# Patient Record
Sex: MALE | Race: WHITE | HISPANIC OR LATINO | Employment: OTHER | URBAN - METROPOLITAN AREA
[De-identification: names, ages, dates, MRNs, and addresses within clinical notes are randomized per-mention and may not be internally consistent; named-entity substitution may affect disease eponyms.]

---

## 2017-12-12 ENCOUNTER — HOSPITAL ENCOUNTER (EMERGENCY)
Facility: MEDICAL CENTER | Age: 82
End: 2017-12-12
Payer: COMMERCIAL

## 2017-12-12 VITALS
HEART RATE: 79 BPM | BODY MASS INDEX: 35.04 KG/M2 | WEIGHT: 205.25 LBS | SYSTOLIC BLOOD PRESSURE: 159 MMHG | RESPIRATION RATE: 18 BRPM | DIASTOLIC BLOOD PRESSURE: 69 MMHG | HEIGHT: 64 IN | OXYGEN SATURATION: 91 % | TEMPERATURE: 98.6 F

## 2017-12-12 LAB
ALBUMIN SERPL BCP-MCNC: 3.7 G/DL (ref 3.2–4.9)
ALBUMIN/GLOB SERPL: 1.1 G/DL
ALP SERPL-CCNC: 78 U/L (ref 30–99)
ALT SERPL-CCNC: 7 U/L (ref 2–50)
ANION GAP SERPL CALC-SCNC: 4 MMOL/L (ref 0–11.9)
APTT PPP: 30.2 SEC (ref 24.7–36)
AST SERPL-CCNC: 17 U/L (ref 12–45)
BASOPHILS # BLD AUTO: 0.5 % (ref 0–1.8)
BASOPHILS # BLD: 0.04 K/UL (ref 0–0.12)
BILIRUB SERPL-MCNC: 0.6 MG/DL (ref 0.1–1.5)
BNP SERPL-MCNC: 176 PG/ML (ref 0–100)
BUN SERPL-MCNC: 17 MG/DL (ref 8–22)
CALCIUM SERPL-MCNC: 8.4 MG/DL (ref 8.5–10.5)
CHLORIDE SERPL-SCNC: 111 MMOL/L (ref 96–112)
CO2 SERPL-SCNC: 27 MMOL/L (ref 20–33)
CREAT SERPL-MCNC: 1.16 MG/DL (ref 0.5–1.4)
EKG IMPRESSION: NORMAL
EOSINOPHIL # BLD AUTO: 0.19 K/UL (ref 0–0.51)
EOSINOPHIL NFR BLD: 2.5 % (ref 0–6.9)
ERYTHROCYTE [DISTWIDTH] IN BLOOD BY AUTOMATED COUNT: 49.2 FL (ref 35.9–50)
GFR SERPL CREATININE-BSD FRML MDRD: >60 ML/MIN/1.73 M 2
GLOBULIN SER CALC-MCNC: 3.3 G/DL (ref 1.9–3.5)
GLUCOSE SERPL-MCNC: 117 MG/DL (ref 65–99)
HCT VFR BLD AUTO: 39.4 % (ref 42–52)
HGB BLD-MCNC: 12.7 G/DL (ref 14–18)
IMM GRANULOCYTES # BLD AUTO: 0.04 K/UL (ref 0–0.11)
IMM GRANULOCYTES NFR BLD AUTO: 0.5 % (ref 0–0.9)
INR PPP: 1.14 (ref 0.87–1.13)
LIPASE SERPL-CCNC: 31 U/L (ref 11–82)
LYMPHOCYTES # BLD AUTO: 1.7 K/UL (ref 1–4.8)
LYMPHOCYTES NFR BLD: 22.3 % (ref 22–41)
MCH RBC QN AUTO: 27.9 PG (ref 27–33)
MCHC RBC AUTO-ENTMCNC: 32.2 G/DL (ref 33.7–35.3)
MCV RBC AUTO: 86.4 FL (ref 81.4–97.8)
MONOCYTES # BLD AUTO: 0.72 K/UL (ref 0–0.85)
MONOCYTES NFR BLD AUTO: 9.4 % (ref 0–13.4)
NEUTROPHILS # BLD AUTO: 4.95 K/UL (ref 1.82–7.42)
NEUTROPHILS NFR BLD: 64.8 % (ref 44–72)
NRBC # BLD AUTO: 0 K/UL
NRBC BLD AUTO-RTO: 0 /100 WBC
PLATELET # BLD AUTO: 168 K/UL (ref 164–446)
PMV BLD AUTO: 9.9 FL (ref 9–12.9)
POTASSIUM SERPL-SCNC: 4.3 MMOL/L (ref 3.6–5.5)
PROT SERPL-MCNC: 7 G/DL (ref 6–8.2)
PROTHROMBIN TIME: 14.3 SEC (ref 12–14.6)
RBC # BLD AUTO: 4.56 M/UL (ref 4.7–6.1)
SODIUM SERPL-SCNC: 142 MMOL/L (ref 135–145)
TROPONIN I SERPL-MCNC: <0.01 NG/ML (ref 0–0.04)
WBC # BLD AUTO: 7.6 K/UL (ref 4.8–10.8)

## 2017-12-12 PROCEDURE — 85610 PROTHROMBIN TIME: CPT

## 2017-12-12 PROCEDURE — 83880 ASSAY OF NATRIURETIC PEPTIDE: CPT

## 2017-12-12 PROCEDURE — 85025 COMPLETE CBC W/AUTO DIFF WBC: CPT

## 2017-12-12 PROCEDURE — 85730 THROMBOPLASTIN TIME PARTIAL: CPT

## 2017-12-12 PROCEDURE — 302449 STATCHG TRIAGE ONLY (STATISTIC)

## 2017-12-12 PROCEDURE — 84484 ASSAY OF TROPONIN QUANT: CPT

## 2017-12-12 PROCEDURE — 80053 COMPREHEN METABOLIC PANEL: CPT

## 2017-12-12 PROCEDURE — 93005 ELECTROCARDIOGRAM TRACING: CPT

## 2017-12-12 PROCEDURE — 83690 ASSAY OF LIPASE: CPT

## 2017-12-13 NOTE — ED NOTES
"Pt bib family. Pt with leg swelling and seen at MD office told to come to ED for \"problems with heart\". Pt appears SOB on exertion. Pt denies. Pt with bilateral ankle swelling. ekg paged.   "

## 2018-02-14 ENCOUNTER — HOSPITAL ENCOUNTER (INPATIENT)
Facility: MEDICAL CENTER | Age: 83
LOS: 5 days | DRG: 602 | End: 2018-02-19
Attending: EMERGENCY MEDICINE | Admitting: INTERNAL MEDICINE
Payer: COMMERCIAL

## 2018-02-14 ENCOUNTER — RESOLUTE PROFESSIONAL BILLING HOSPITAL PROF FEE (OUTPATIENT)
Dept: HOSPITALIST | Facility: MEDICAL CENTER | Age: 83
End: 2018-02-14
Payer: COMMERCIAL

## 2018-02-14 ENCOUNTER — APPOINTMENT (OUTPATIENT)
Dept: RADIOLOGY | Facility: MEDICAL CENTER | Age: 83
DRG: 602 | End: 2018-02-14
Attending: EMERGENCY MEDICINE
Payer: COMMERCIAL

## 2018-02-14 ENCOUNTER — APPOINTMENT (OUTPATIENT)
Dept: RADIOLOGY | Facility: MEDICAL CENTER | Age: 83
DRG: 602 | End: 2018-02-14
Payer: COMMERCIAL

## 2018-02-14 ENCOUNTER — APPOINTMENT (OUTPATIENT)
Dept: RADIOLOGY | Facility: MEDICAL CENTER | Age: 83
DRG: 602 | End: 2018-02-14
Attending: INTERNAL MEDICINE
Payer: COMMERCIAL

## 2018-02-14 DIAGNOSIS — R60.0 BILATERAL LOWER EXTREMITY EDEMA: ICD-10-CM

## 2018-02-14 DIAGNOSIS — I50.9 CONGESTIVE HEART FAILURE, UNSPECIFIED CONGESTIVE HEART FAILURE CHRONICITY, UNSPECIFIED CONGESTIVE HEART FAILURE TYPE: ICD-10-CM

## 2018-02-14 DIAGNOSIS — I50.32 CHRONIC DIASTOLIC HEART FAILURE (HCC): ICD-10-CM

## 2018-02-14 DIAGNOSIS — R60.9 PERIPHERAL EDEMA: ICD-10-CM

## 2018-02-14 DIAGNOSIS — R09.02 HYPOXEMIA: ICD-10-CM

## 2018-02-14 DIAGNOSIS — L03.116 CELLULITIS OF LEFT LOWER EXTREMITY: ICD-10-CM

## 2018-02-14 PROBLEM — L03.90 CELLULITIS: Status: ACTIVE | Noted: 2018-02-14

## 2018-02-14 LAB
ALBUMIN SERPL BCP-MCNC: 3.8 G/DL (ref 3.2–4.9)
ALBUMIN/GLOB SERPL: 1.1 G/DL
ALP SERPL-CCNC: 91 U/L (ref 30–99)
ALT SERPL-CCNC: 5 U/L (ref 2–50)
ANION GAP SERPL CALC-SCNC: 9 MMOL/L (ref 0–11.9)
APTT PPP: 31.5 SEC (ref 24.7–36)
AST SERPL-CCNC: 17 U/L (ref 12–45)
BASOPHILS # BLD AUTO: 0.3 % (ref 0–1.8)
BASOPHILS # BLD: 0.03 K/UL (ref 0–0.12)
BILIRUB SERPL-MCNC: 1 MG/DL (ref 0.1–1.5)
BNP SERPL-MCNC: 141 PG/ML (ref 0–100)
BUN SERPL-MCNC: 19 MG/DL (ref 8–22)
CALCIUM SERPL-MCNC: 9.3 MG/DL (ref 8.5–10.5)
CHLORIDE SERPL-SCNC: 108 MMOL/L (ref 96–112)
CO2 SERPL-SCNC: 22 MMOL/L (ref 20–33)
COMMENT 1642: NORMAL
CREAT SERPL-MCNC: 1.06 MG/DL (ref 0.5–1.4)
EKG IMPRESSION: NORMAL
EOSINOPHIL # BLD AUTO: 0.19 K/UL (ref 0–0.51)
EOSINOPHIL NFR BLD: 2.1 % (ref 0–6.9)
ERYTHROCYTE [DISTWIDTH] IN BLOOD BY AUTOMATED COUNT: 51.3 FL (ref 35.9–50)
EST. AVERAGE GLUCOSE BLD GHB EST-MCNC: 120 MG/DL
GLOBULIN SER CALC-MCNC: 3.6 G/DL (ref 1.9–3.5)
GLUCOSE SERPL-MCNC: 91 MG/DL (ref 65–99)
HBA1C MFR BLD: 5.8 % (ref 0–5.6)
HCT VFR BLD AUTO: 42.8 % (ref 42–52)
HGB BLD-MCNC: 12.8 G/DL (ref 14–18)
HYPOCHROMIA BLD QL SMEAR: ABNORMAL
IMM GRANULOCYTES # BLD AUTO: 0.03 K/UL (ref 0–0.11)
IMM GRANULOCYTES NFR BLD AUTO: 0.3 % (ref 0–0.9)
INR PPP: 1.1 (ref 0.87–1.13)
LACTATE BLD-SCNC: 1.6 MMOL/L (ref 0.5–2)
LIPASE SERPL-CCNC: 35 U/L (ref 11–82)
LYMPHOCYTES # BLD AUTO: 1.49 K/UL (ref 1–4.8)
LYMPHOCYTES NFR BLD: 16.7 % (ref 22–41)
MAGNESIUM SERPL-MCNC: 2.2 MG/DL (ref 1.5–2.5)
MCH RBC QN AUTO: 25.1 PG (ref 27–33)
MCHC RBC AUTO-ENTMCNC: 29.9 G/DL (ref 33.7–35.3)
MCV RBC AUTO: 84.1 FL (ref 81.4–97.8)
MONOCYTES # BLD AUTO: 0.85 K/UL (ref 0–0.85)
MONOCYTES NFR BLD AUTO: 9.6 % (ref 0–13.4)
MORPHOLOGY BLD-IMP: NORMAL
NEUTROPHILS # BLD AUTO: 6.31 K/UL (ref 1.82–7.42)
NEUTROPHILS NFR BLD: 71 % (ref 44–72)
NRBC # BLD AUTO: 0 K/UL
NRBC BLD-RTO: 0 /100 WBC
OVALOCYTES BLD QL SMEAR: NORMAL
PLATELET # BLD AUTO: 179 K/UL (ref 164–446)
PLATELET BLD QL SMEAR: NORMAL
PMV BLD AUTO: 10.4 FL (ref 9–12.9)
POIKILOCYTOSIS BLD QL SMEAR: NORMAL
POTASSIUM SERPL-SCNC: 4.4 MMOL/L (ref 3.6–5.5)
PROCALCITONIN SERPL-MCNC: <0.05 NG/ML
PROT SERPL-MCNC: 7.4 G/DL (ref 6–8.2)
PROTHROMBIN TIME: 13.9 SEC (ref 12–14.6)
RBC # BLD AUTO: 5.09 M/UL (ref 4.7–6.1)
RBC BLD AUTO: PRESENT
SODIUM SERPL-SCNC: 139 MMOL/L (ref 135–145)
TROPONIN I SERPL-MCNC: 0.01 NG/ML (ref 0–0.04)
TSH SERPL DL<=0.005 MIU/L-ACNC: 7.04 UIU/ML (ref 0.38–5.33)
WBC # BLD AUTO: 8.9 K/UL (ref 4.8–10.8)

## 2018-02-14 PROCEDURE — A9270 NON-COVERED ITEM OR SERVICE: HCPCS | Performed by: EMERGENCY MEDICINE

## 2018-02-14 PROCEDURE — 83605 ASSAY OF LACTIC ACID: CPT

## 2018-02-14 PROCEDURE — 87040 BLOOD CULTURE FOR BACTERIA: CPT | Mod: 91

## 2018-02-14 PROCEDURE — 90662 IIV NO PRSV INCREASED AG IM: CPT | Performed by: INTERNAL MEDICINE

## 2018-02-14 PROCEDURE — 700111 HCHG RX REV CODE 636 W/ 250 OVERRIDE (IP): Performed by: INTERNAL MEDICINE

## 2018-02-14 PROCEDURE — 93005 ELECTROCARDIOGRAM TRACING: CPT

## 2018-02-14 PROCEDURE — 90471 IMMUNIZATION ADMIN: CPT

## 2018-02-14 PROCEDURE — 93970 EXTREMITY STUDY: CPT

## 2018-02-14 PROCEDURE — 83690 ASSAY OF LIPASE: CPT

## 2018-02-14 PROCEDURE — 71045 X-RAY EXAM CHEST 1 VIEW: CPT

## 2018-02-14 PROCEDURE — 36415 COLL VENOUS BLD VENIPUNCTURE: CPT

## 2018-02-14 PROCEDURE — 84145 PROCALCITONIN (PCT): CPT

## 2018-02-14 PROCEDURE — 700105 HCHG RX REV CODE 258: Performed by: INTERNAL MEDICINE

## 2018-02-14 PROCEDURE — 96365 THER/PROPH/DIAG IV INF INIT: CPT

## 2018-02-14 PROCEDURE — 83880 ASSAY OF NATRIURETIC PEPTIDE: CPT

## 2018-02-14 PROCEDURE — A9270 NON-COVERED ITEM OR SERVICE: HCPCS | Performed by: INTERNAL MEDICINE

## 2018-02-14 PROCEDURE — 700102 HCHG RX REV CODE 250 W/ 637 OVERRIDE(OP): Performed by: INTERNAL MEDICINE

## 2018-02-14 PROCEDURE — 85610 PROTHROMBIN TIME: CPT

## 2018-02-14 PROCEDURE — 770020 HCHG ROOM/CARE - TELE (206)

## 2018-02-14 PROCEDURE — 73590 X-RAY EXAM OF LOWER LEG: CPT | Mod: LT

## 2018-02-14 PROCEDURE — 84443 ASSAY THYROID STIM HORMONE: CPT

## 2018-02-14 PROCEDURE — 83735 ASSAY OF MAGNESIUM: CPT

## 2018-02-14 PROCEDURE — 700102 HCHG RX REV CODE 250 W/ 637 OVERRIDE(OP): Performed by: EMERGENCY MEDICINE

## 2018-02-14 PROCEDURE — 96375 TX/PRO/DX INJ NEW DRUG ADDON: CPT

## 2018-02-14 PROCEDURE — 93005 ELECTROCARDIOGRAM TRACING: CPT | Performed by: EMERGENCY MEDICINE

## 2018-02-14 PROCEDURE — 304561 HCHG STAT O2

## 2018-02-14 PROCEDURE — 99223 1ST HOSP IP/OBS HIGH 75: CPT | Performed by: INTERNAL MEDICINE

## 2018-02-14 PROCEDURE — 83036 HEMOGLOBIN GLYCOSYLATED A1C: CPT

## 2018-02-14 PROCEDURE — 84484 ASSAY OF TROPONIN QUANT: CPT

## 2018-02-14 PROCEDURE — 80053 COMPREHEN METABOLIC PANEL: CPT

## 2018-02-14 PROCEDURE — 700111 HCHG RX REV CODE 636 W/ 250 OVERRIDE (IP): Performed by: EMERGENCY MEDICINE

## 2018-02-14 PROCEDURE — 3E0234Z INTRODUCTION OF SERUM, TOXOID AND VACCINE INTO MUSCLE, PERCUTANEOUS APPROACH: ICD-10-PCS | Performed by: INTERNAL MEDICINE

## 2018-02-14 PROCEDURE — 99285 EMERGENCY DEPT VISIT HI MDM: CPT

## 2018-02-14 PROCEDURE — 85730 THROMBOPLASTIN TIME PARTIAL: CPT

## 2018-02-14 PROCEDURE — 85025 COMPLETE CBC W/AUTO DIFF WBC: CPT

## 2018-02-14 PROCEDURE — 90670 PCV13 VACCINE IM: CPT | Performed by: INTERNAL MEDICINE

## 2018-02-14 RX ORDER — HEPARIN SODIUM 5000 [USP'U]/ML
5000 INJECTION, SOLUTION INTRAVENOUS; SUBCUTANEOUS EVERY 8 HOURS
Status: DISCONTINUED | OUTPATIENT
Start: 2018-02-14 | End: 2018-02-19 | Stop reason: HOSPADM

## 2018-02-14 RX ORDER — LEVOTHYROXINE SODIUM 0.05 MG/1
50 TABLET ORAL DAILY
Status: DISCONTINUED | OUTPATIENT
Start: 2018-02-14 | End: 2018-02-14

## 2018-02-14 RX ORDER — BISACODYL 10 MG
10 SUPPOSITORY, RECTAL RECTAL
Status: DISCONTINUED | OUTPATIENT
Start: 2018-02-14 | End: 2018-02-19 | Stop reason: HOSPADM

## 2018-02-14 RX ORDER — ALBUTEROL SULFATE 90 UG/1
2 AEROSOL, METERED RESPIRATORY (INHALATION)
Status: DISCONTINUED | OUTPATIENT
Start: 2018-02-14 | End: 2018-02-19 | Stop reason: HOSPADM

## 2018-02-14 RX ORDER — PRAVASTATIN SODIUM 20 MG
80 TABLET ORAL DAILY
Status: DISCONTINUED | OUTPATIENT
Start: 2018-02-14 | End: 2018-02-14

## 2018-02-14 RX ORDER — METOPROLOL TARTRATE 1 MG/ML
5 INJECTION, SOLUTION INTRAVENOUS
Status: DISCONTINUED | OUTPATIENT
Start: 2018-02-14 | End: 2018-02-19 | Stop reason: HOSPADM

## 2018-02-14 RX ORDER — AMOXICILLIN 250 MG
2 CAPSULE ORAL 2 TIMES DAILY
Status: DISCONTINUED | OUTPATIENT
Start: 2018-02-14 | End: 2018-02-19 | Stop reason: HOSPADM

## 2018-02-14 RX ORDER — ALBUTEROL SULFATE 90 UG/1
2 AEROSOL, METERED RESPIRATORY (INHALATION)
Status: DISCONTINUED | OUTPATIENT
Start: 2018-02-14 | End: 2018-02-14

## 2018-02-14 RX ORDER — ACETAMINOPHEN 325 MG/1
650 TABLET ORAL EVERY 6 HOURS PRN
Status: DISCONTINUED | OUTPATIENT
Start: 2018-02-14 | End: 2018-02-19 | Stop reason: HOSPADM

## 2018-02-14 RX ORDER — LABETALOL HYDROCHLORIDE 5 MG/ML
10 INJECTION, SOLUTION INTRAVENOUS EVERY 4 HOURS PRN
Status: DISCONTINUED | OUTPATIENT
Start: 2018-02-14 | End: 2018-02-19 | Stop reason: HOSPADM

## 2018-02-14 RX ORDER — CLOPIDOGREL BISULFATE 75 MG/1
75 TABLET ORAL DAILY
Status: DISCONTINUED | OUTPATIENT
Start: 2018-02-14 | End: 2018-02-14

## 2018-02-14 RX ORDER — SULFAMETHOXAZOLE AND TRIMETHOPRIM 800; 160 MG/1; MG/1
1 TABLET ORAL ONCE
Status: COMPLETED | OUTPATIENT
Start: 2018-02-14 | End: 2018-02-14

## 2018-02-14 RX ORDER — FUROSEMIDE 10 MG/ML
40 INJECTION INTRAMUSCULAR; INTRAVENOUS ONCE
Status: COMPLETED | OUTPATIENT
Start: 2018-02-14 | End: 2018-02-14

## 2018-02-14 RX ORDER — POLYETHYLENE GLYCOL 3350 17 G/17G
1 POWDER, FOR SOLUTION ORAL
Status: DISCONTINUED | OUTPATIENT
Start: 2018-02-14 | End: 2018-02-19 | Stop reason: HOSPADM

## 2018-02-14 RX ORDER — ASPIRIN 81 MG/1
81 TABLET, CHEWABLE ORAL DAILY
Status: DISCONTINUED | OUTPATIENT
Start: 2018-02-14 | End: 2018-02-19 | Stop reason: HOSPADM

## 2018-02-14 RX ORDER — FUROSEMIDE 10 MG/ML
20 INJECTION INTRAMUSCULAR; INTRAVENOUS
Status: DISCONTINUED | OUTPATIENT
Start: 2018-02-14 | End: 2018-02-16

## 2018-02-14 RX ORDER — LOSARTAN POTASSIUM 50 MG/1
50 TABLET ORAL DAILY
Status: DISCONTINUED | OUTPATIENT
Start: 2018-02-14 | End: 2018-02-14

## 2018-02-14 RX ORDER — AMOXICILLIN AND CLAVULANATE POTASSIUM 875; 125 MG/1; MG/1
1 TABLET, FILM COATED ORAL ONCE
Status: COMPLETED | OUTPATIENT
Start: 2018-02-14 | End: 2018-02-14

## 2018-02-14 RX ORDER — SPIRONOLACTONE 25 MG/1
25 TABLET ORAL DAILY
Status: DISCONTINUED | OUTPATIENT
Start: 2018-02-14 | End: 2018-02-14

## 2018-02-14 RX ADMIN — INFLUENZA A VIRUSA/MICHIGAN/45/2015 X-275 (H1N1) ANTIGEN (FORMALDEHYDE INACTIVATED), INFLUENZA A VIRUS A/HONG KONG/4801/2014 X-263B (H3N2) ANTIGEN (FORMALDEHYDE INACTIVATED), AND INFLUENZA B VIRUS B/BRISBANE/60/2008 ANTIGEN (FORMALDEHYDE INACTIVATED) 0.5 ML: 60; 60; 60 INJECTION, SUSPENSION INTRAMUSCULAR at 21:56

## 2018-02-14 RX ADMIN — ASPIRIN 81 MG: 81 TABLET, CHEWABLE ORAL at 21:53

## 2018-02-14 RX ADMIN — FUROSEMIDE 20 MG: 10 INJECTION, SOLUTION INTRAMUSCULAR; INTRAVENOUS at 21:56

## 2018-02-14 RX ADMIN — HEPARIN SODIUM 5000 UNITS: 5000 INJECTION, SOLUTION INTRAVENOUS; SUBCUTANEOUS at 21:56

## 2018-02-14 RX ADMIN — VANCOMYCIN HYDROCHLORIDE 2200 MG: 100 INJECTION, POWDER, LYOPHILIZED, FOR SOLUTION INTRAVENOUS at 19:50

## 2018-02-14 RX ADMIN — SULFAMETHOXAZOLE AND TRIMETHOPRIM 1 TABLET: 800; 160 TABLET ORAL at 17:17

## 2018-02-14 RX ADMIN — PNEUMOCOCCAL 13-VALENT CONJUGATE VACCINE 0.5 ML: 2.2; 2.2; 2.2; 2.2; 2.2; 4.4; 2.2; 2.2; 2.2; 2.2; 2.2; 2.2; 2.2 INJECTION, SUSPENSION INTRAMUSCULAR at 21:57

## 2018-02-14 RX ADMIN — FUROSEMIDE 40 MG: 10 INJECTION, SOLUTION INTRAMUSCULAR; INTRAVENOUS at 17:17

## 2018-02-14 RX ADMIN — AMOXICILLIN AND CLAVULANATE POTASSIUM 1 TABLET: 875; 125 TABLET, FILM COATED ORAL at 17:17

## 2018-02-14 RX ADMIN — STANDARDIZED SENNA CONCENTRATE AND DOCUSATE SODIUM 2 TABLET: 8.6; 5 TABLET, FILM COATED ORAL at 21:55

## 2018-02-14 RX ADMIN — AMPICILLIN SODIUM AND SULBACTAM SODIUM 3 G: 2; 1 INJECTION, POWDER, FOR SOLUTION INTRAMUSCULAR; INTRAVENOUS at 23:37

## 2018-02-14 ASSESSMENT — COGNITIVE AND FUNCTIONAL STATUS - GENERAL
DRESSING REGULAR LOWER BODY CLOTHING: A LITTLE
MOVING FROM LYING ON BACK TO SITTING ON SIDE OF FLAT BED: A LITTLE
MOVING TO AND FROM BED TO CHAIR: A LITTLE
TOILETING: A LITTLE
DAILY ACTIVITIY SCORE: 20
SUGGESTED CMS G CODE MODIFIER MOBILITY: CK
CLIMB 3 TO 5 STEPS WITH RAILING: A LITTLE
HELP NEEDED FOR BATHING: A LITTLE
WALKING IN HOSPITAL ROOM: A LITTLE
SUGGESTED CMS G CODE MODIFIER DAILY ACTIVITY: CJ
PERSONAL GROOMING: A LITTLE
MOBILITY SCORE: 19
STANDING UP FROM CHAIR USING ARMS: A LITTLE

## 2018-02-14 ASSESSMENT — PATIENT HEALTH QUESTIONNAIRE - PHQ9
SUM OF ALL RESPONSES TO PHQ QUESTIONS 1-9: 0
SUM OF ALL RESPONSES TO PHQ9 QUESTIONS 1 AND 2: 0
2. FEELING DOWN, DEPRESSED, IRRITABLE, OR HOPELESS: NOT AT ALL
SUM OF ALL RESPONSES TO PHQ QUESTIONS 1-9: 0
2. FEELING DOWN, DEPRESSED, IRRITABLE, OR HOPELESS: NOT AT ALL
SUM OF ALL RESPONSES TO PHQ9 QUESTIONS 1 AND 2: 0
1. LITTLE INTEREST OR PLEASURE IN DOING THINGS: NOT AT ALL
1. LITTLE INTEREST OR PLEASURE IN DOING THINGS: NOT AT ALL

## 2018-02-14 ASSESSMENT — LIFESTYLE VARIABLES
EVER_SMOKED: YES
ALCOHOL_USE: NO
ALCOHOL_USE: NO

## 2018-02-14 ASSESSMENT — ENCOUNTER SYMPTOMS
WEAKNESS: 0
EYES NEGATIVE: 1
CARDIOVASCULAR NEGATIVE: 1
PSYCHIATRIC NEGATIVE: 1
WEIGHT LOSS: 0
FEVER: 0
DIAPHORESIS: 0
NEUROLOGICAL NEGATIVE: 1
CHILLS: 0
GASTROINTESTINAL NEGATIVE: 1
COUGH: 1
MUSCULOSKELETAL NEGATIVE: 1
RESPIRATORY NEGATIVE: 1

## 2018-02-14 ASSESSMENT — COPD QUESTIONNAIRES
HAVE YOU SMOKED AT LEAST 100 CIGARETTES IN YOUR ENTIRE LIFE: YES
COPD SCREENING SCORE: 5
DURING THE PAST 4 WEEKS HOW MUCH DID YOU FEEL SHORT OF BREATH: NONE/LITTLE OF THE TIME
DO YOU EVER COUGH UP ANY MUCUS OR PHLEGM?: NO/ONLY WITH OCCASIONAL COLDS OR INFECTIONS

## 2018-02-14 NOTE — ED TRIAGE NOTES
Chief Complaint   Patient presents with   • Wound Check     sent from MD   pt went to MD office for wound check he has bilateral LE swelling with open wound to L sheen/ankle wrapped he is PMD NAD pt has sob placed on O2 nc he is A&O Bulgarian speaking.

## 2018-02-15 PROBLEM — I50.33 ACUTE ON CHRONIC DIASTOLIC CONGESTIVE HEART FAILURE (HCC): Status: ACTIVE | Noted: 2018-02-15

## 2018-02-15 LAB
ALBUMIN SERPL BCP-MCNC: 3.7 G/DL (ref 3.2–4.9)
ALBUMIN/GLOB SERPL: 1.1 G/DL
ALP SERPL-CCNC: 78 U/L (ref 30–99)
ALT SERPL-CCNC: 5 U/L (ref 2–50)
ANION GAP SERPL CALC-SCNC: 11 MMOL/L (ref 0–11.9)
AST SERPL-CCNC: 15 U/L (ref 12–45)
BILIRUB SERPL-MCNC: 1.1 MG/DL (ref 0.1–1.5)
BUN SERPL-MCNC: 19 MG/DL (ref 8–22)
CALCIUM SERPL-MCNC: 8.8 MG/DL (ref 8.5–10.5)
CHLORIDE SERPL-SCNC: 105 MMOL/L (ref 96–112)
CO2 SERPL-SCNC: 23 MMOL/L (ref 20–33)
CREAT SERPL-MCNC: 1.35 MG/DL (ref 0.5–1.4)
ERYTHROCYTE [DISTWIDTH] IN BLOOD BY AUTOMATED COUNT: 49.7 FL (ref 35.9–50)
ERYTHROCYTE [SEDIMENTATION RATE] IN BLOOD BY WESTERGREN METHOD: 43 MM/HOUR (ref 0–20)
GLOBULIN SER CALC-MCNC: 3.4 G/DL (ref 1.9–3.5)
GLUCOSE SERPL-MCNC: 98 MG/DL (ref 65–99)
GRAM STN SPEC: NORMAL
HCT VFR BLD AUTO: 41.2 % (ref 42–52)
HGB BLD-MCNC: 12.6 G/DL (ref 14–18)
LV EJECT FRACT  99904: 60
LV EJECT FRACT MOD 2C 99903: 47.24
LV EJECT FRACT MOD 4C 99902: 61.96
LV EJECT FRACT MOD BP 99901: 57.08
MCH RBC QN AUTO: 25.3 PG (ref 27–33)
MCHC RBC AUTO-ENTMCNC: 30.6 G/DL (ref 33.7–35.3)
MCV RBC AUTO: 82.7 FL (ref 81.4–97.8)
PLATELET # BLD AUTO: 144 K/UL (ref 164–446)
PMV BLD AUTO: 9.8 FL (ref 9–12.9)
POTASSIUM SERPL-SCNC: 4.3 MMOL/L (ref 3.6–5.5)
PROT SERPL-MCNC: 7.1 G/DL (ref 6–8.2)
RBC # BLD AUTO: 4.98 M/UL (ref 4.7–6.1)
SIGNIFICANT IND 70042: NORMAL
SITE SITE: NORMAL
SODIUM SERPL-SCNC: 139 MMOL/L (ref 135–145)
SOURCE SOURCE: NORMAL
T4 FREE SERPL-MCNC: 0.82 NG/DL (ref 0.53–1.43)
TSH SERPL DL<=0.005 MIU/L-ACNC: 6.45 UIU/ML (ref 0.38–5.33)
WBC # BLD AUTO: 8.8 K/UL (ref 4.8–10.8)

## 2018-02-15 PROCEDURE — 84443 ASSAY THYROID STIM HORMONE: CPT

## 2018-02-15 PROCEDURE — 85027 COMPLETE CBC AUTOMATED: CPT

## 2018-02-15 PROCEDURE — 99233 SBSQ HOSP IP/OBS HIGH 50: CPT | Performed by: INTERNAL MEDICINE

## 2018-02-15 PROCEDURE — 87077 CULTURE AEROBIC IDENTIFY: CPT

## 2018-02-15 PROCEDURE — 97161 PT EVAL LOW COMPLEX 20 MIN: CPT

## 2018-02-15 PROCEDURE — 770020 HCHG ROOM/CARE - TELE (206)

## 2018-02-15 PROCEDURE — 93306 TTE W/DOPPLER COMPLETE: CPT

## 2018-02-15 PROCEDURE — 87205 SMEAR GRAM STAIN: CPT

## 2018-02-15 PROCEDURE — 36415 COLL VENOUS BLD VENIPUNCTURE: CPT

## 2018-02-15 PROCEDURE — 85652 RBC SED RATE AUTOMATED: CPT

## 2018-02-15 PROCEDURE — 700102 HCHG RX REV CODE 250 W/ 637 OVERRIDE(OP): Performed by: INTERNAL MEDICINE

## 2018-02-15 PROCEDURE — 87186 SC STD MICRODIL/AGAR DIL: CPT

## 2018-02-15 PROCEDURE — 700111 HCHG RX REV CODE 636 W/ 250 OVERRIDE (IP): Performed by: INTERNAL MEDICINE

## 2018-02-15 PROCEDURE — 700101 HCHG RX REV CODE 250: Performed by: INTERNAL MEDICINE

## 2018-02-15 PROCEDURE — A9270 NON-COVERED ITEM OR SERVICE: HCPCS | Performed by: INTERNAL MEDICINE

## 2018-02-15 PROCEDURE — 87070 CULTURE OTHR SPECIMN AEROBIC: CPT

## 2018-02-15 PROCEDURE — 84439 ASSAY OF FREE THYROXINE: CPT

## 2018-02-15 PROCEDURE — 80053 COMPREHEN METABOLIC PANEL: CPT

## 2018-02-15 PROCEDURE — 93306 TTE W/DOPPLER COMPLETE: CPT | Mod: 26 | Performed by: INTERNAL MEDICINE

## 2018-02-15 PROCEDURE — 700105 HCHG RX REV CODE 258: Performed by: INTERNAL MEDICINE

## 2018-02-15 RX ORDER — OXYCODONE HYDROCHLORIDE 10 MG/1
10 TABLET ORAL EVERY 4 HOURS PRN
Status: DISCONTINUED | OUTPATIENT
Start: 2018-02-15 | End: 2018-02-19 | Stop reason: HOSPADM

## 2018-02-15 RX ORDER — OXYCODONE HYDROCHLORIDE 5 MG/1
5 TABLET ORAL EVERY 4 HOURS PRN
Status: DISCONTINUED | OUTPATIENT
Start: 2018-02-15 | End: 2018-02-19 | Stop reason: HOSPADM

## 2018-02-15 RX ORDER — LEVOTHYROXINE SODIUM 0.05 MG/1
50 TABLET ORAL
Status: DISCONTINUED | OUTPATIENT
Start: 2018-02-15 | End: 2018-02-19 | Stop reason: HOSPADM

## 2018-02-15 RX ORDER — OXYCODONE HYDROCHLORIDE 5 MG/1
5-10 TABLET ORAL EVERY 4 HOURS PRN
Status: DISCONTINUED | OUTPATIENT
Start: 2018-02-15 | End: 2018-02-15

## 2018-02-15 RX ORDER — CARVEDILOL 3.12 MG/1
3.12 TABLET ORAL 2 TIMES DAILY WITH MEALS
Status: DISCONTINUED | OUTPATIENT
Start: 2018-02-15 | End: 2018-02-19 | Stop reason: HOSPADM

## 2018-02-15 RX ORDER — ATORVASTATIN CALCIUM 40 MG/1
40 TABLET, FILM COATED ORAL
Status: DISCONTINUED | OUTPATIENT
Start: 2018-02-15 | End: 2018-02-19 | Stop reason: HOSPADM

## 2018-02-15 RX ORDER — CLINDAMYCIN PHOSPHATE 600 MG/50ML
600 INJECTION, SOLUTION INTRAVENOUS EVERY 8 HOURS
Status: DISCONTINUED | OUTPATIENT
Start: 2018-02-15 | End: 2018-02-16

## 2018-02-15 RX ORDER — SODIUM CHLORIDE 9 MG/ML
INJECTION, SOLUTION INTRAVENOUS
Status: ACTIVE
Start: 2018-02-15 | End: 2018-02-15

## 2018-02-15 RX ADMIN — AMPICILLIN SODIUM AND SULBACTAM SODIUM 3 G: 2; 1 INJECTION, POWDER, FOR SOLUTION INTRAMUSCULAR; INTRAVENOUS at 05:53

## 2018-02-15 RX ADMIN — AMPICILLIN SODIUM AND SULBACTAM SODIUM 3 G: 2; 1 INJECTION, POWDER, FOR SOLUTION INTRAMUSCULAR; INTRAVENOUS at 23:58

## 2018-02-15 RX ADMIN — ASPIRIN 81 MG: 81 TABLET, CHEWABLE ORAL at 09:15

## 2018-02-15 RX ADMIN — CLINDAMYCIN IN 5 PERCENT DEXTROSE 600 MG: 12 INJECTION, SOLUTION INTRAVENOUS at 20:23

## 2018-02-15 RX ADMIN — ATORVASTATIN CALCIUM 40 MG: 40 TABLET, FILM COATED ORAL at 20:22

## 2018-02-15 RX ADMIN — HEPARIN SODIUM 5000 UNITS: 5000 INJECTION, SOLUTION INTRAVENOUS; SUBCUTANEOUS at 05:53

## 2018-02-15 RX ADMIN — CARVEDILOL 3.12 MG: 3.12 TABLET, FILM COATED ORAL at 11:33

## 2018-02-15 RX ADMIN — STANDARDIZED SENNA CONCENTRATE AND DOCUSATE SODIUM 2 TABLET: 8.6; 5 TABLET, FILM COATED ORAL at 09:15

## 2018-02-15 RX ADMIN — AMPICILLIN SODIUM AND SULBACTAM SODIUM 3 G: 2; 1 INJECTION, POWDER, FOR SOLUTION INTRAMUSCULAR; INTRAVENOUS at 17:14

## 2018-02-15 RX ADMIN — FUROSEMIDE 20 MG: 10 INJECTION, SOLUTION INTRAMUSCULAR; INTRAVENOUS at 09:15

## 2018-02-15 RX ADMIN — ACETAMINOPHEN 650 MG: 325 TABLET, FILM COATED ORAL at 09:15

## 2018-02-15 RX ADMIN — OXYCODONE HYDROCHLORIDE 10 MG: 10 TABLET ORAL at 17:12

## 2018-02-15 RX ADMIN — HEPARIN SODIUM 5000 UNITS: 5000 INJECTION, SOLUTION INTRAVENOUS; SUBCUTANEOUS at 20:23

## 2018-02-15 RX ADMIN — AMPICILLIN SODIUM AND SULBACTAM SODIUM 3 G: 2; 1 INJECTION, POWDER, FOR SOLUTION INTRAMUSCULAR; INTRAVENOUS at 11:35

## 2018-02-15 RX ADMIN — CLINDAMYCIN IN 5 PERCENT DEXTROSE 600 MG: 12 INJECTION, SOLUTION INTRAVENOUS at 13:35

## 2018-02-15 RX ADMIN — HEPARIN SODIUM 5000 UNITS: 5000 INJECTION, SOLUTION INTRAVENOUS; SUBCUTANEOUS at 13:35

## 2018-02-15 RX ADMIN — LEVOTHYROXINE SODIUM 50 MCG: 50 TABLET ORAL at 11:33

## 2018-02-15 RX ADMIN — CARVEDILOL 3.12 MG: 3.12 TABLET, FILM COATED ORAL at 17:14

## 2018-02-15 ASSESSMENT — COGNITIVE AND FUNCTIONAL STATUS - GENERAL
DAILY ACTIVITIY SCORE: 20
DRESSING REGULAR LOWER BODY CLOTHING: A LITTLE
MOVING FROM LYING ON BACK TO SITTING ON SIDE OF FLAT BED: A LITTLE
WALKING IN HOSPITAL ROOM: A LITTLE
TOILETING: A LITTLE
MOVING TO AND FROM BED TO CHAIR: A LITTLE
CLIMB 3 TO 5 STEPS WITH RAILING: A LITTLE
HELP NEEDED FOR BATHING: A LITTLE
STANDING UP FROM CHAIR USING ARMS: A LITTLE
CLIMB 3 TO 5 STEPS WITH RAILING: A LITTLE
HELP NEEDED FOR BATHING: A LITTLE
MOVING TO AND FROM BED TO CHAIR: A LITTLE
SUGGESTED CMS G CODE MODIFIER DAILY ACTIVITY: CJ
SUGGESTED CMS G CODE MODIFIER DAILY ACTIVITY: CJ
MOVING FROM LYING ON BACK TO SITTING ON SIDE OF FLAT BED: A LITTLE
SUGGESTED CMS G CODE MODIFIER MOBILITY: CK
PERSONAL GROOMING: A LITTLE
MOBILITY SCORE: 19
MOBILITY SCORE: 19
DAILY ACTIVITIY SCORE: 20
TOILETING: A LITTLE
SUGGESTED CMS G CODE MODIFIER MOBILITY: CK
PERSONAL GROOMING: A LITTLE
WALKING IN HOSPITAL ROOM: A LITTLE
DRESSING REGULAR LOWER BODY CLOTHING: A LITTLE
STANDING UP FROM CHAIR USING ARMS: A LITTLE

## 2018-02-15 ASSESSMENT — ENCOUNTER SYMPTOMS
HALLUCINATIONS: 0
FOCAL WEAKNESS: 0
NAUSEA: 0
HEADACHES: 0
HEARTBURN: 0
PALPITATIONS: 0
DIZZINESS: 0
WEAKNESS: 0
ABDOMINAL PAIN: 0
CHILLS: 0
BLOOD IN STOOL: 0
COUGH: 0
DEPRESSION: 0
DIARRHEA: 0
BACK PAIN: 0
MYALGIAS: 0
SHORTNESS OF BREATH: 0
FEVER: 0
SORE THROAT: 0
VOMITING: 0

## 2018-02-15 ASSESSMENT — PAIN SCALES - GENERAL
PAINLEVEL_OUTOF10: 0

## 2018-02-15 NOTE — PROGRESS NOTES
"Pharmacy Kinetics 82 y.o. male on vancomycin day # 2 2/15/2018    Currently Dose: Vancomycin 1300 mg iv q24hr (~15 mg/kg)  Received Load Dose: Yes    Indication for Treatment: SSTI  ID Service Following: No    Pertinent history per medical record: Admitted on 2/14/2018 for cellulitis from reported blister on lower extremity. Drainage reported for ~1 week prior to admission. Admitted to telemetry.    Other antibiotics: ampicillin/sulbactam 3 gm iv q6h    Allergies: Patient has no known allergies.     List concerns for accumulation of vancomycin: age ~82, renal dysfunction, Hx CHF    Pertinent cultures to date:   Results     Procedure Component Value Units Date/Time    GRAM STAIN [[id number]] Collected:  02/15/18 0000    Order Status:  Completed Specimen:  Wound Updated:  02/15/18 0745     Significant Indicator .     Source WND     Site LEFT LEG     Gram Stain Result --     Rare WBCs.  Rare Gram positive cocci.      Narrative:       Collected By:[id number] ARLYN KNIGHT    BLOOD CULTURE [[id number]] Collected:  02/14/18 2102    Order Status:  Completed Specimen:  Blood from Peripheral Updated:  02/15/18 0655     Significant Indicator NEG     Source BLD     Site PERIPHERAL     Blood Culture --     No Growth    Note: Blood cultures are incubated for 5 days and  are monitored continuously.Positive blood cultures  are called to the RN and reported as soon as  they are identified.      Narrative:       Per Hospital Policy: Only change Specimen Src: to \"Line\" if  specified by physician order.    BLOOD CULTURE [[id number]] Collected:  02/14/18 2102    Order Status:  Completed Specimen:  Blood from Peripheral Updated:  02/15/18 0655     Significant Indicator NEG     Source BLD     Site PERIPHERAL     Blood Culture --     No Growth    Note: Blood cultures are incubated for 5 days and  are monitored continuously.Positive blood cultures  are called to the RN and reported as soon as  they are identified.      Narrative:       " "Per Hospital Policy: Only change Specimen Src: to \"Line\" if  specified by physician order.    CULTURE WOUND W/ GRAM STAIN [[id number]] Collected:  02/15/18 0000    Order Status:  Completed Specimen:  Wound from Left Leg Updated:  02/15/18 0106    Narrative:       Collected By:[id number] ARLYN MARY LOU KNIGHT        Recent Labs      18   1515  02/15/18   0309   WBC  8.9  8.8   NEUTSPOLYS  71.00   --      Recent Labs      18   1515  02/15/18   0310   BUN  19  19   CREATININE  1.06  1.35   ALBUMIN  3.8  3.7     Intake/Output Summary (Last 24 hours) at 02/15/18 0823  Last data filed at 02/15/18 0737   Gross per 24 hour   Intake                0 ml   Output             2550 ml   Net            -2550 ml      Blood pressure 111/59, pulse 65, temperature 36.6 °C (97.9 °F), resp. rate 18, weight 83.9 kg (184 lb 15.5 oz), SpO2 97 %. Temp (24hrs), Av.9 °C (98.5 °F), Min:36.3 °C (97.3 °F), Max:37.4 °C (99.3 °F)    CrCl cannot be calculated (Unknown ideal weight.).    A/P   1. Vancomycin dose change: not indicated   2. Next vancomycin level: 18 @1230  3. Goal trough: 12-16 mcg/mL  4. Comments: VS stable. Afebrile. WBC stable. Microbiology pending. SCr increased overnight. Factors for accumulation noted. Trough placed  prior to PM dose to ensure clearance. Pharmacy will follow and continue to adjust as appropriate. Would consider de-escalation as able per primary team assessment.    Deandre Valladares, PharmD  "

## 2018-02-15 NOTE — ASSESSMENT & PLAN NOTE
Mild shortness of breath, bilateral pitting edema, , CXR shows some interstitial edema but echo was normal with no e/o heart failure.  Started on IV lasix 20 mg daily, continue  He has not been compliant due to poor insight and lack of funding  Strict I/O  Fluid and salt restriction  Repeat 2D echo, essentially normal, no significant change.

## 2018-02-15 NOTE — ED NOTES
"Daughter states that pt told family about leg swelling today, states that pt ran out of \"water pills\" x 1 month ago, pt with 3+ pitting edema to LE, pt ambulatory to room with strong steady gait, pt placed on O2 in triage due to low sats, pt does not use home O2,   "

## 2018-02-15 NOTE — PROGRESS NOTES
"Pharmacy Kinetics 82 y.o. male on vancomycin day # 1   2018    Currently on Vancomycin 2200 mg IV x1 followed 1300 mg IV q24h    Indication for Treatment: SSTI LLE    Pertinent history per medical record: Admitted on 2018 for wound check/SOB. Increased cough, ran out of \"water pills\" a month ago. Had a blister on his left leg that he popped. Since then he has had purulent drainage x1 week.    Other antibiotics: Unasyn 3 g IV q6h    Allergies: Patient has no known allergies.     List concerns for renal function: age, BMI    Pertinent cultures to date:   None at this time    Recent Labs      18   1515   WBC  8.9   NEUTSPOLYS  71.00     Recent Labs      18   1515   BUN  19   CREATININE  1.06   ALBUMIN  3.8     Blood pressure 150/58, pulse 71, temperature 36.3 °C (97.3 °F), resp. rate 18, weight 87.7 kg (193 lb 5.5 oz), SpO2 93 %. Temp (24hrs), Av.3 °C (97.3 °F), Min:36.3 °C (97.3 °F), Max:36.3 °C (97.3 °F)      A/P   1. Vancomycin dose change: new start  2. Next vancomycin level:  2-3 days (not ordered)  3. Goal trough: 12-16 mcg/mL  4. Comments:  D/w MD wants vanco for now as patient has had drainage for over a week then can de-escalate in a few days if he improves. Will start scheduled dose and check level in a couple of days if remains on vanco or if decompensates.    Kareem Moody, PharmD, BCPS      "

## 2018-02-15 NOTE — HEART FAILURE PROGRAM
Cardiovascular Nurse Navigator () Progress Note:      Chief Complaint: Leg swelling/cellulitis    Please note that patient has a possible diagnosis of HF. Education should be deferred until this diagnosis is confirmed and an MD has discussed it with the patient.    BNP: 141   Current echo: pending completion  Prior echo: EF 60%, Grade I diastolic dysfunction which per new guidelines is not diagnositic for Diastolic HF  Diuresis: 3 doses IV furosemide  Prior cardiology encounter: patient saw Dr. Cooper once in October of 2016, HF was not diagnosed on this visit  Prior HF diagnosis: No    HF is a new diagnosis for this patient, if it is to be the primary problem on this admission, cardiology consult is needed.    Thank you, please call with questions.

## 2018-02-15 NOTE — ED NOTES
Med rec complete per pt and daughter at bedside. NKDA. Pt does not remember if meds were taken this morning.

## 2018-02-15 NOTE — H&P
[facility] [place] [facility] Medicine Admitting History and Physical    Name Jose Polanco 1935   Age/Sex 82 y.o. male   MRN [id number]   Code Status FULL         Chief Complaint:  Left leg swelling/cellulitis     HPI:  82 year old male with hx of HTN , preDM and CAD with 2 stents came with leg swelling and redness.  The patient does not speak English and the story was taken from his daughter, lalita to his daughter the patient has had worsening swelling on his legs and he is not able to refill and get water pills, and recently he developed redness and very sharp pain and bluster and pus drainage, he denies any fever or chills no chest pain of palpitation and no syncope.  He lives alone abd to do ADLs by hmself no limitation, quit smoking years ago and no drinking or drugs.     In the ED: the patient is not on acute distress SIRS: 0/4 he has crackles, he has redness swelling and drainage ulcer on his L leg, the patient will be admitted for cellulitis.     Review of Systems   Constitutional: Negative for chills, diaphoresis, fever, malaise/fatigue and weight loss.   HENT: Negative.    Eyes: Negative.    Respiratory: Negative.    Cardiovascular: Negative.    Gastrointestinal: Negative.    Genitourinary: Negative.    Musculoskeletal: Negative.    Skin: Positive for rash.   Neurological: Negative.  Negative for weakness.   Psychiatric/Behavioral: Negative.              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[facility])    • Benign essential HTN    • CAD in native artery    • Carotid bruit     bilat   • Cataract    • Chest tightness, discomfort, or pressure    • Chronic kidney disease, unspecified 2012   • Chronic renal insufficiency    • Congestive heart failure (CMS-[facility])    • COPD (chronic obstructive pulmonary  disease) (CMS-HCC)    • Coronary atherosclerosis of native coronary artery 6/18/2012   • Edema    • Edema 6/18/2012   • Essential hypertension, benign 6/18/2012   • Fall    • History of echocardiogram 2/16/2010    EF 55% w/ mild AR, mod MR and Mild TR    • Hx of heart artery stent 2/15/10 by JI    cath showed 3 vessel cad with patent lad stent, small vessel diagonal branch stenosis, occluded LCX and high grade post descending art branch stenosis with placement of 2.5 x 18 mm [place] drug eluding stent to proximal LCX on 2/15/10, placement of 3.5 x 18 mm [employer] bare metal stent to mid LAD with addtional 20% left main stenosis and 80% distal posterior descending artery stenosis in 2003   • Hyperlipidemia    • Hypothyroidism    • MI, old     ant wall, history of   • Non compliance with medical treatment     history of   • Non-Q wave myocardial infarction (CMS-HCC)    • Old myocardial infarction 6/18/2012   • Other and unspecified hyperlipidemia 6/18/2012   • Other chest pain 6/18/2012   • Pneumonia    • S/P coronary artery stent placement 6/18/2012   • Stroke (CMS-HCC) 16 years ago    Per daughter   • Unspecified hypothyroidism 6/18/2012   • Urinary incontinence        Past Surgical History:  Past Surgical History:   Procedure Laterality Date   • CHOLECYSTECTOMY         Current Outpatient Medications:  Home Medications     Reviewed by Jessica Singh (Pharmacy Tech) on 02/14/18 at 1807  Med List Status: Complete   Medication Last Dose Status   aspirin (ASA) 81 MG CHEW unknown Active   GARLIC PO unknown Active   Omega-3 Fatty Acids (FISH OIL PO) unknown Active                Medication Allergy/Sensitivities:  No Known Allergies      Family History:  Family History   Problem Relation Age of Onset   • Heart Disease Mother    • Heart Disease Father        Social History:  Social History     Social History   • Marital status: Legally      Spouse name: N/A   • Number of children: N/A   • Years of education: N/A      Occupational History   • Not on file.     Social History Main Topics   • Smoking status: Former Smoker     Types: Cigarettes   • Smokeless tobacco: Former User     Quit date: 9/24/1974   • Alcohol use Not on file   • Drug use: Unknown   • Sexual activity: Not on file     Other Topics Concern   • Not on file     Social History Narrative    ** Merged History Encounter **          Living situation: alone   PCP : Marta Villarreal M.D.        Physical Exam     Vitals:    02/14/18 1730 02/14/18 1801 02/14/18 1900 02/14/18 1930   BP:       Pulse: 65 72 71 71   Resp: (!) 3 (!) 27 14 (!) 26   Temp:       SpO2: 96% 88% 91% 94%   Weight:         Body mass index is 33.19 kg/m².  /58   Pulse 71   Temp 36.3 °C (97.3 °F)   Resp (!) 26   Wt 87.7 kg (193 lb 5.5 oz)   SpO2 94%   BMI 33.19 kg/m²   O2 therapy: Pulse Oximetry: 94 %, O2 (LPM): 2, O2 Delivery: Nasal Cannula    Physical Exam   Constitutional: He is oriented to person, place, and time and well-developed, well-nourished, and in no distress. No distress.   Neck: No tracheal deviation present. No thyromegaly present.   Cardiovascular: Normal rate.    No murmur heard.  Pulmonary/Chest: No respiratory distress. He has no wheezes. He has rales.   Abdominal: He exhibits no distension. There is no tenderness. There is no rebound.   Musculoskeletal: He exhibits edema.   Neurological: He is alert and oriented to person, place, and time.   Skin: Skin is warm. There is erythema.   Redness and drainage on his L leg and swelling and redness but no infection on the L leg.              Data Review       Old Records Request:   Completed  Current Records review and summary: Completed    Lab Data Review:  Recent Results (from the past 24 hour(s))   Troponin    Collection Time: 02/14/18  3:15 PM   Result Value Ref Range    Troponin I 0.01 0.00 - 0.04 ng/mL   Btype Natriuretic Peptide    Collection Time: 02/14/18  3:15 PM   Result Value Ref Range    B Natriuretic Peptide 141 (H) 0  - 100 pg/mL   CBC with Differential    Collection Time: 02/14/18  3:15 PM   Result Value Ref Range    WBC 8.9 4.8 - 10.8 K/uL    RBC 5.09 4.70 - 6.10 M/uL    Hemoglobin 12.8 (L) 14.0 - 18.0 g/dL    Hematocrit 42.8 42.0 - 52.0 %    MCV 84.1 81.4 - 97.8 fL    MCH 25.1 (L) 27.0 - 33.0 pg    MCHC 29.9 (L) 33.7 - 35.3 g/dL    RDW 51.3 (H) 35.9 - 50.0 fL    Platelet Count 179 164 - 446 K/uL    MPV 10.4 9.0 - 12.9 fL    Neutrophils-Polys 71.00 44.00 - 72.00 %    Lymphocytes 16.70 (L) 22.00 - 41.00 %    Monocytes 9.60 0.00 - 13.40 %    Eosinophils 2.10 0.00 - 6.90 %    Basophils 0.30 0.00 - 1.80 %    Immature Granulocytes 0.30 0.00 - 0.90 %    Nucleated RBC 0.00 /100 WBC    Lymphs (Absolute) 1.49 1.00 - 4.80 K/uL    Monos (Absolute) 0.85 0.00 - 0.85 K/uL    Eos (Absolute) 0.19 0.00 - 0.51 K/uL    Baso (Absolute) 0.03 0.00 - 0.12 K/uL    Immature Granulocytes (abs) 0.03 0.00 - 0.11 K/uL    NRBC (Absolute) 0.00 K/uL    Neutrophils (Absolute) 6.31 1.82 - 7.42 K/uL    Hypochromia 1+    Complete Metabolic Panel (CMP)    Collection Time: 02/14/18  3:15 PM   Result Value Ref Range    Sodium 139 135 - 145 mmol/L    Potassium 4.4 3.6 - 5.5 mmol/L    Chloride 108 96 - 112 mmol/L    Co2 22 20 - 33 mmol/L    Anion Gap 9.0 0.0 - 11.9    Glucose 91 65 - 99 mg/dL    Bun 19 8 - 22 mg/dL    Creatinine 1.06 0.50 - 1.40 mg/dL    Calcium 9.3 8.5 - 10.5 mg/dL    AST(SGOT) 17 12 - 45 U/L    ALT(SGPT) 5 2 - 50 U/L    Alkaline Phosphatase 91 30 - 99 U/L    Total Bilirubin 1.0 0.1 - 1.5 mg/dL    Albumin 3.8 3.2 - 4.9 g/dL    Total Protein 7.4 6.0 - 8.2 g/dL    Globulin 3.6 (H) 1.9 - 3.5 g/dL    A-G Ratio 1.1 g/dL   Prothrombin Time    Collection Time: 02/14/18  3:15 PM   Result Value Ref Range    PT 13.9 12.0 - 14.6 sec    INR 1.10 0.87 - 1.13   APTT    Collection Time: 02/14/18  3:15 PM   Result Value Ref Range    APTT 31.5 24.7 - 36.0 sec   Lipase    Collection Time: 02/14/18  3:15 PM   Result Value Ref Range    Lipase 35 11 - 82 U/L   PERIPHERAL  SMEAR REVIEW    Collection Time: 18  3:15 PM   Result Value Ref Range    Peripheral Smear Review see below    PLATELET ESTIMATE    Collection Time: 18  3:15 PM   Result Value Ref Range    Plt Estimation Normal    MORPHOLOGY    Collection Time: 18  3:15 PM   Result Value Ref Range    RBC Morphology Present     Poikilocytosis 1+     Ovalocytes 1+    DIFFERENTIAL COMMENT    Collection Time: 18  3:15 PM   Result Value Ref Range    Comments-Diff see below    ESTIMATED GFR    Collection Time: 18  3:15 PM   Result Value Ref Range    GFR If African American >60 >60 mL/min/1.73 m 2    GFR If Non African American >60 >60 mL/min/1.73 m 2   TSH (Thyroid Stimulating Hormone)    Collection Time: 18  3:15 PM   Result Value Ref Range    TSH 7.040 (H) 0.380 - 5.330 uIU/mL   MAGNESIUM    Collection Time: 18  3:15 PM   Result Value Ref Range    Magnesium 2.2 1.5 - 2.5 mg/dL   EKG (ER)    Collection Time: 18  3:23 PM   Result Value Ref Range    Report       [facility]    Test Date:  2018  Pt Name:    ZAC WALL                 Department: ER  MRN:        [id number]                      Room:  Gender:     Male                         Technician: [id number]  :        1935                   Requested By:ER TRIAGE PROTOCOL  Order #:    [id number]                    Reading MD: CHLOÉ CALIXTO MD    Measurements  Intervals                                Axis  Rate:       67                           P:          63  IA:         204                          QRS:        -30  QRSD:       84                           T:          67  QT:         432  QTc:        456    Interpretive Statements  SINUS RHYTHM  INFERIOR INFARCT, OLD  Compared to ECG 2017 17:17:28  No significant changes    Electronically Signed On 2018 16:40:23 PST by CHLOÉ CALIXTO MD     LACTIC ACID    Collection Time: 18  5:42 PM   Result Value Ref Range    Lactic Acid  1.6 0.5 - 2.0 mmol/L       Imaging/Procedures Review:    ndependant Imaging Review: Completed  LE VENOUS DUPLEX   Final Result      DX-TIBIA AND FIBULA LEFT   Final Result      No acute posttraumatic abnormality appreciated. No obvious area of cortex destruction identified.      DX-CHEST-LIMITED (1 VIEW)   Final Result      1.  Bilateral interstitial infiltrates. These were present on prior examination. This may represent interstitial edema versus chronic interstitial lung disease.      2.  Cardiomegaly.      ECHOCARDIOGRAM COMP W/O CONT    (Results Pending)            EKG:   EKG Independant Review: Completed  QTc:456, HR: 67, Normal Sinus Rhythm, no ST/T changes   No tachy   (X) Records reviewed and summarized in current documentation           Assessment/Plan     Cellulitis   Assessment & Plan    Redness, swelling and drainage  Strep and staph  Vanco and Unasyn and deescalate later.  Lactic acid 1.6  No leukocytosis and no fever SIRS: 0/4  The patient is not septic and LA normal and he has edema and crackles we will start with Lasix at this time and reassess him later and start with IV fluid if needed.   X ray to r/o osteo  Doppler to r/o DVT  A1c pending   Wound care   Blood and wound culture         CAD in native artery- (present on admission)   Assessment & Plan    Hx of 2 stents  No active chest pain   Trop negative and EKG no changes   Crackles and edema  Repeat echo  Cont home meds ASA   Cont aldactone, statin, ACEI and BB.   Lasix 20 mg IV daily         Hypothyroidism- (present on admission)   Assessment & Plan    Repeat TSH  Cont home dose of levothyroxine         Edema- (present on admission)   Assessment & Plan    With crackles   Could be exacerbated with venous insufficiency   Repeat echo  Losartan, metoprolol and statin.   Lasix 20 mg IV daily         Benign essential HTN- (present on admission)   Assessment & Plan    130s/80s  No meds at home  Follow up.             Anticipated Hospital stay:  >2  midnights        Quality Measures  Quality-Core Measures   Reviewed items::  Labs reviewed, EKG reviewed, Radiology images reviewed and Medications reviewed  Paredes catheter::  No Paredes  DVT prophylaxis pharmacological::  Heparin

## 2018-02-15 NOTE — ED PROVIDER NOTES
"ED Provider Note    Scribed for Keven Alberto M.D. by Lynda Adams. 2/14/2018, 4:29 PM.    Primary care provider: Marta Villarreal M.D.  Means of arrival: Walk-in  History obtained from: Patient  History limited by: None    CHIEF COMPLAINT  Chief Complaint   Patient presents with   • Wound Check     sent from MD   • Shortness of Breath       HPI  Jose Polanco is a 82 y.o. male who presents to the Emergency Department for evaluation of bilateral leg swelling and open wound to left ankle. Per family, patient ran out of his \"water pills\" one month ago. They have noticed increased coughing since arriving here. No complaints of fevers. Patient no longer smokes cigarettes. Per family, patient is pre-diabetic. . She denies any overt chest pain, just describes increasing shortness of breath, leg pain. Primary reason for coming in, brought was not only the hypoxemia was noted. 85% but also that his left leg a large blister that popped and now slightly red and painful. She was initially seen by their primary care physician and referred to the department for further treatment and care. Upon arrival here, the patient was placed on oxygen. She is complaining of left leg pain and shortness of breath. Denies any other symptoms.      REVIEW OF SYSTEMS  Review of Systems   Respiratory: Positive for cough.    Cardiovascular: Positive for leg swelling.   Skin:        Open wound to left ankle   All other systems reviewed and are negative.  C.      PAST MEDICAL HISTORY   has a past medical history of Acute myocardial infarction, subendocardial infarction, episode of care unspecified (6/18/2012); Anginal syndrome (CMS-HCC); Benign essential HTN; CAD in native artery; Carotid bruit; Cataract; Chest tightness, discomfort, or pressure; Chronic kidney disease, unspecified (6/18/2012); Chronic renal insufficiency; Congestive heart failure (CMS-[facility]); COPD (chronic obstructive pulmonary disease) (CMS-HCC); Coronary atherosclerosis of " native coronary artery (6/18/2012); Edema; Edema (6/18/2012); Essential hypertension, benign (6/18/2012); Fall; History of echocardiogram (2/16/2010); heart artery stent (2/15/10 by BRIANA); Hyperlipidemia; Hypothyroidism; MI, old; Non compliance with medical treatment; Non-Q wave myocardial infarction (CMS-HCC); Old myocardial infarction (6/18/2012); Other and unspecified hyperlipidemia (6/18/2012); Other chest pain (6/18/2012); Pneumonia; S/P coronary artery stent placement (6/18/2012); Stroke (CMS-HCC) (16 years ago); Unspecified hypothyroidism (6/18/2012); and Urinary incontinence.    SURGICAL HISTORY   has a past surgical history that includes cholecystectomy.    SOCIAL HISTORY  Social History   Substance Use Topics   • Smoking status: Former Smoker     Types: Cigarettes   • Smokeless tobacco: Former User     Quit date: 9/24/1974      History   Drug use: Unknown       FAMILY HISTORY  Family History   Problem Relation Age of Onset   • Heart Disease Mother    • Heart Disease Father        CURRENT MEDICATIONS  No current facility-administered medications for this encounter.     Current Outpatient Prescriptions:   •  clopidogrel (PLAVIX) 75 MG Tab, Take 1 Tab by mouth every day., Disp: 30 Tab, Rfl: 3  •  albuterol (VENTOLIN HFA) 108 (90 BASE) MCG/ACT Aero Soln inhalation aerosol, Inhale 2 Puffs by mouth every 6 hours as needed., Disp: 8.5 g, Rfl: 3  •  levothyroxine (SYNTHROID) 50 MCG Tab, Take 1 Tab by mouth every day., Disp: 30 Tab, Rfl: 0  •  losartan (COZAAR) 50 MG Tab, Take 1 Tab by mouth every day., Disp: 30 Tab, Rfl: 0  •  pravastatin (PRAVACHOL) 80 MG tablet, Take 1 Tab by mouth every day., Disp: 30 Tab, Rfl: 0  •  spironolactone (ALDACTONE) 25 MG Tab, Take 1 Tab by mouth every day., Disp: 30 Tab, Rfl: 0  •  albuterol (VENTOLIN HFA) 108 (90 BASE) MCG/ACT Aero Soln inhalation aerosol, Inhale 2 Puffs by mouth every 6 hours as needed., Disp: 8.5 g, Rfl: 0  •  GARLIC PO, Take 1 Tab by mouth every day., Disp: , Rfl:    •  Omega-3 Fatty Acids (FISH OIL PO), Take 1 Cap by mouth every day., Disp: , Rfl:   •  aspirin (ASA) 81 MG CHEW, Take 81 mg by mouth every day., Disp: , Rfl:       ALLERGIES  No Known Allergies    PHYSICAL EXAM  VITAL SIGNS: /58   Pulse 71   Temp 36.3 °C (97.3 °F)   Resp 18   Wt 87.7 kg (193 lb 5.5 oz)   SpO2 93%   BMI 33.19 kg/m²     Constitutional: Elderly appearance, but otherwise Well developed, Well nourished, No acute distress, Non-toxic appearance.   HENT: Normocephalic, Atraumatic, Bilateral external ears normal, oropharynx moist, No oral exudates, Nose normal.   Eyes: Pupils are equal round and react to light, extraocular motions are intact, conjunctiva is normal, there are no signs of exudate.   Neck: Supple, no meningeal signs.  Lymphatic: No lymphadenopathy noted.   Cardiovascular: Regular rate and rhythm without murmurs gallops or rubs.   Thorax & Lungs: No respiratory distress. Breathing comfortably. Crackles to lung bases bilaterally, there are no wheezes no rales. Chest wall is nontender.  Abdomen: Soft, nontender, nondistended. Bowel sounds are present.   Skin: Large blister to left ankle with surrounding erythema . The blister itself is approximate 6 cm in diameter surrounding erythematous about an additional 6 cm beyond that.  Back: No tenderness, No CVA tenderness.  Musculoskeletal: 2+ pitting edema to bilateral lower extremities. No major deformities noted. Intact distal pulses, no clubbing, no cyanosis  Neurologic: Alert & oriented x 3, Moving all extremities. No gross abnormalities.    Psychiatric: Affect normal, Judgment normal, Mood normal.       LABS  Results for orders placed or performed during the hospital encounter of 02/14/18   Troponin   Result Value Ref Range    Troponin I 0.01 0.00 - 0.04 ng/mL   Btype Natriuretic Peptide   Result Value Ref Range    B Natriuretic Peptide 141 (H) 0 - 100 pg/mL   CBC with Differential   Result Value Ref Range    WBC 8.9 4.8 - 10.8 K/uL     RBC 5.09 4.70 - 6.10 M/uL    Hemoglobin 12.8 (L) 14.0 - 18.0 g/dL    Hematocrit 42.8 42.0 - 52.0 %    MCV 84.1 81.4 - 97.8 fL    MCH 25.1 (L) 27.0 - 33.0 pg    MCHC 29.9 (L) 33.7 - 35.3 g/dL    RDW 51.3 (H) 35.9 - 50.0 fL    Platelet Count 179 164 - 446 K/uL    MPV 10.4 9.0 - 12.9 fL    Neutrophils-Polys 71.00 44.00 - 72.00 %    Lymphocytes 16.70 (L) 22.00 - 41.00 %    Monocytes 9.60 0.00 - 13.40 %    Eosinophils 2.10 0.00 - 6.90 %    Basophils 0.30 0.00 - 1.80 %    Immature Granulocytes 0.30 0.00 - 0.90 %    Nucleated RBC 0.00 /100 WBC    Lymphs (Absolute) 1.49 1.00 - 4.80 K/uL    Monos (Absolute) 0.85 0.00 - 0.85 K/uL    Eos (Absolute) 0.19 0.00 - 0.51 K/uL    Baso (Absolute) 0.03 0.00 - 0.12 K/uL    Immature Granulocytes (abs) 0.03 0.00 - 0.11 K/uL    NRBC (Absolute) 0.00 K/uL    Neutrophils (Absolute) 6.31 1.82 - 7.42 K/uL    Hypochromia 1+    Complete Metabolic Panel (CMP)   Result Value Ref Range    Sodium 139 135 - 145 mmol/L    Potassium 4.4 3.6 - 5.5 mmol/L    Chloride 108 96 - 112 mmol/L    Co2 22 20 - 33 mmol/L    Anion Gap 9.0 0.0 - 11.9    Glucose 91 65 - 99 mg/dL    Bun 19 8 - 22 mg/dL    Creatinine 1.06 0.50 - 1.40 mg/dL    Calcium 9.3 8.5 - 10.5 mg/dL    AST(SGOT) 17 12 - 45 U/L    ALT(SGPT) 5 2 - 50 U/L    Alkaline Phosphatase 91 30 - 99 U/L    Total Bilirubin 1.0 0.1 - 1.5 mg/dL    Albumin 3.8 3.2 - 4.9 g/dL    Total Protein 7.4 6.0 - 8.2 g/dL    Globulin 3.6 (H) 1.9 - 3.5 g/dL    A-G Ratio 1.1 g/dL   Prothrombin Time   Result Value Ref Range    PT 13.9 12.0 - 14.6 sec    INR 1.10 0.87 - 1.13   APTT   Result Value Ref Range    APTT 31.5 24.7 - 36.0 sec   Lipase   Result Value Ref Range    Lipase 35 11 - 82 U/L   PERIPHERAL SMEAR REVIEW   Result Value Ref Range    Peripheral Smear Review see below    PLATELET ESTIMATE   Result Value Ref Range    Plt Estimation Normal    MORPHOLOGY   Result Value Ref Range    RBC Morphology Present     Poikilocytosis 1+     Ovalocytes 1+    DIFFERENTIAL COMMENT    Result Value Ref Range    Comments-Diff see below    ESTIMATED GFR   Result Value Ref Range    GFR If African American >60 >60 mL/min/1.73 m 2    GFR If Non African American >60 >60 mL/min/1.73 m 2   LACTIC ACID   Result Value Ref Range    Lactic Acid 1.6 0.5 - 2.0 mmol/L   MAGNESIUM   Result Value Ref Range    Magnesium 2.2 1.5 - 2.5 mg/dL   EKG (ER)   Result Value Ref Range    Report       [facility] Emergency Dept.    Test Date:  2018  Pt Name:    ZAC WALL                 Department: ER  MRN:        [id number]                      Room:  Gender:     Male                         Technician: [id number]  :        1935                   Requested By:ER TRIAGE PROTOCOL  Order #:    [id number]                    Reading MD: CHLOÉ CALIXTO MD    Measurements  Intervals                                Axis  Rate:       67                           P:          63  TN:         204                          QRS:        -30  QRSD:       84                           T:          67  QT:         432  QTc:        456    Interpretive Statements  SINUS RHYTHM  INFERIOR INFARCT, OLD  Compared to ECG 2017 17:17:28  No significant changes    Electronically Signed On 2018 16:40:23 PST by CHLOÉ CALIXTO MD     All labs reviewed by me.    EKG  Interpreted by me as above.      RADIOLOGY  DX-TIBIA AND FIBULA LEFT   Final Result      No acute posttraumatic abnormality appreciated. No obvious area of cortex destruction identified.      DX-CHEST-LIMITED (1 VIEW)   Final Result      1.  Bilateral interstitial infiltrates. These were present on prior examination. This may represent interstitial edema versus chronic interstitial lung disease.      2.  Cardiomegaly.      LE VENOUS DUPLEX    (Results Pending)   ECHOCARDIOGRAM COMP W/O CONT    (Results Pending)     The radiologist's interpretation of all radiological studies have been reviewed by me.      COURSE & MEDICAL DECISION MAKING  Pertinent  Labs & Imaging studies reviewed. (See chart for details)    4:29 PM - Patient seen and examined at bedside. Patient will be treated with Bactrim 800-160 mg, Augmentin 875-125 mg, Lasix 40 mcg. Ordered DX Chest, Troponin, BNP, CBC, CMP, Prothrombin Time, APTT, Lipase, Peripheral Smear Review, Platelet Estimate, Morphology, Differential Comment, Estimated GFR to evaluate his symptoms. Will treat with Antibiotics for open wound which appears to be infected. Will provide medications to reduce swelling and discussed plan to admit. Patient and family understand and agree to plan. The differential diagnoses include but are not limited to: Cellulitis, CHF with peripheral edema    4:42 PM I discussed the patient's case and the above findings with Dr. Zarate (Hospitalist) who agreed to admit patient.    Decision Making:  Presents to the ED for evaluation. Clinically, the patient does have findings consistent with congestive heart failure as well as what appears to be cellulitis to left lower extremity. I'll start the patient on oral antibiotic for cellulitis. If placed him on oxygen as well as started with Lasix for diuresis. At this point because of his continued hypoxemia and the above symptoms. I do feel the patient should be admitted to the hospital for further treatment and care. I've spoken to the hospitalist for this admission.    DISPOSITION:  Patient will be admitted to Dr. Zarate, Hospitalist, in guarded condition.      FINAL IMPRESSION  1. Congestive heart failure, unspecified congestive heart failure chronicity, unspecified congestive heart failure type (CMS-HCC)    2. Hypoxemia    3. Peripheral edema    4. Cellulitis of left lower extremity          Lynda BEAN), am scribing for, and in the presence of, Keven Alberto M.D..    Electronically signed by: Lynda Tariq), 2/14/2018    IKeven M.D. personally performed the services described in this documentation, as  scribed by Lynda Adams in my presence, and it is both accurate and complete.    The note accurately reflects work and decisions made by me.  Keven Alberto  2/14/2018  7:57 PM

## 2018-02-15 NOTE — ASSESSMENT & PLAN NOTE
Hx of 2 stents, appears not taking meds for this at home.  Language barrier, Crackles and edema but no e/o CHF on echo (?PAH)  No active chest pain   Trop negative and EKG no changes   Add ASA , statin, BB  No indication for Spironolactone or ACE inhibitor based on ejection fraction, we will avoid starting these due to patient's limited capacity to remember taking medications  Lasix 20 mg IV BID

## 2018-02-15 NOTE — PROGRESS NOTES
2 RN skin check with Brendon RN    Patients sacrum and elbow are blanchable. Behind patient's left ear is red and blanching, behind patient's right ear is flaky, red, and blanching. Silicon nasal canula applied and offloaded with foam pads.    Patient has 3+ pitting edema in bilateral lower extremities. Patient has open blister wound on lateral side of left leg, wound consult in place. Photo will be taken and appropriate LDA will be created in [facility].

## 2018-02-15 NOTE — CARE PLAN
Problem: Infection  Goal: Will remain free from infection  IV unasyn and cleocin     Problem: Knowledge Deficit  Goal: Knowledge of disease process/condition, treatment plan, diagnostic tests, and medications will improve  POC: ECHO, wound consult

## 2018-02-15 NOTE — PROGRESS NOTES
Patient brought up from ED. Patient placed on monitor and monitor room notified, patient in SR. Patient denies pain at this moment and has no other needs at this time. Patient educated to call when in need of assistance.

## 2018-02-15 NOTE — DIETARY
[employer] - Alert received for newly identified wound. Wound team consult pending, will await wound staging to make recommendations if appropriate. RD will monitor per dept policy.

## 2018-02-15 NOTE — ASSESSMENT & PLAN NOTE
With crackles   Could be exacerbated with venous insufficiency   Repeat echo  Losartan, metoprolol and statin.   Lasix 20 mg IV daily

## 2018-02-15 NOTE — RESPIRATORY CARE
COPD EDUCATION by COPD CLINICAL EDUCATOR  2/15/2018 at 7:10 AM by Dee Watkins     Patient reviewed by COPD education team. Patient does not qualify for COPD program.

## 2018-02-16 ENCOUNTER — PATIENT OUTREACH (OUTPATIENT)
Dept: HEALTH INFORMATION MANAGEMENT | Facility: OTHER | Age: 83
End: 2018-02-16

## 2018-02-16 PROBLEM — R60.0 BILATERAL LOWER EXTREMITY EDEMA: Status: ACTIVE | Noted: 2018-02-15

## 2018-02-16 PROBLEM — J96.01 ACUTE RESPIRATORY FAILURE WITH HYPOXIA (HCC): Status: ACTIVE | Noted: 2018-02-16

## 2018-02-16 LAB
ANION GAP SERPL CALC-SCNC: 9 MMOL/L (ref 0–11.9)
BASOPHILS # BLD AUTO: 0.3 % (ref 0–1.8)
BASOPHILS # BLD: 0.02 K/UL (ref 0–0.12)
BUN SERPL-MCNC: 25 MG/DL (ref 8–22)
CALCIUM SERPL-MCNC: 8.4 MG/DL (ref 8.5–10.5)
CHLORIDE SERPL-SCNC: 106 MMOL/L (ref 96–112)
CO2 SERPL-SCNC: 21 MMOL/L (ref 20–33)
CREAT SERPL-MCNC: 1.28 MG/DL (ref 0.5–1.4)
EOSINOPHIL # BLD AUTO: 0.23 K/UL (ref 0–0.51)
EOSINOPHIL NFR BLD: 3.6 % (ref 0–6.9)
ERYTHROCYTE [DISTWIDTH] IN BLOOD BY AUTOMATED COUNT: 49.1 FL (ref 35.9–50)
GLUCOSE SERPL-MCNC: 88 MG/DL (ref 65–99)
HCT VFR BLD AUTO: 37.2 % (ref 42–52)
HGB BLD-MCNC: 11.7 G/DL (ref 14–18)
IMM GRANULOCYTES # BLD AUTO: 0.02 K/UL (ref 0–0.11)
IMM GRANULOCYTES NFR BLD AUTO: 0.3 % (ref 0–0.9)
LYMPHOCYTES # BLD AUTO: 1.09 K/UL (ref 1–4.8)
LYMPHOCYTES NFR BLD: 17.2 % (ref 22–41)
MCH RBC QN AUTO: 25.8 PG (ref 27–33)
MCHC RBC AUTO-ENTMCNC: 31.5 G/DL (ref 33.7–35.3)
MCV RBC AUTO: 82.1 FL (ref 81.4–97.8)
MONOCYTES # BLD AUTO: 0.77 K/UL (ref 0–0.85)
MONOCYTES NFR BLD AUTO: 12.1 % (ref 0–13.4)
NEUTROPHILS # BLD AUTO: 4.21 K/UL (ref 1.82–7.42)
NEUTROPHILS NFR BLD: 66.5 % (ref 44–72)
NRBC # BLD AUTO: 0 K/UL
NRBC BLD-RTO: 0 /100 WBC
PLATELET # BLD AUTO: 142 K/UL (ref 164–446)
PMV BLD AUTO: 10 FL (ref 9–12.9)
POTASSIUM SERPL-SCNC: 3.5 MMOL/L (ref 3.6–5.5)
RBC # BLD AUTO: 4.53 M/UL (ref 4.7–6.1)
SODIUM SERPL-SCNC: 136 MMOL/L (ref 135–145)
WBC # BLD AUTO: 6.3 K/UL (ref 4.8–10.8)

## 2018-02-16 PROCEDURE — 700102 HCHG RX REV CODE 250 W/ 637 OVERRIDE(OP): Performed by: INTERNAL MEDICINE

## 2018-02-16 PROCEDURE — 85025 COMPLETE CBC W/AUTO DIFF WBC: CPT

## 2018-02-16 PROCEDURE — 700101 HCHG RX REV CODE 250: Performed by: INTERNAL MEDICINE

## 2018-02-16 PROCEDURE — 99356 PR PROLONGED SVC I/P OR OBS SETTING 1ST HOUR: CPT | Performed by: INTERNAL MEDICINE

## 2018-02-16 PROCEDURE — 700111 HCHG RX REV CODE 636 W/ 250 OVERRIDE (IP): Performed by: INTERNAL MEDICINE

## 2018-02-16 PROCEDURE — 36415 COLL VENOUS BLD VENIPUNCTURE: CPT

## 2018-02-16 PROCEDURE — 99232 SBSQ HOSP IP/OBS MODERATE 35: CPT | Performed by: INTERNAL MEDICINE

## 2018-02-16 PROCEDURE — 80048 BASIC METABOLIC PNL TOTAL CA: CPT

## 2018-02-16 PROCEDURE — 770020 HCHG ROOM/CARE - TELE (206)

## 2018-02-16 PROCEDURE — A9270 NON-COVERED ITEM OR SERVICE: HCPCS | Performed by: INTERNAL MEDICINE

## 2018-02-16 PROCEDURE — 700105 HCHG RX REV CODE 258: Performed by: INTERNAL MEDICINE

## 2018-02-16 RX ORDER — FUROSEMIDE 20 MG/1
20 TABLET ORAL
Status: DISCONTINUED | OUTPATIENT
Start: 2018-02-16 | End: 2018-02-17

## 2018-02-16 RX ORDER — FUROSEMIDE 20 MG/1
20 TABLET ORAL
Status: DISCONTINUED | OUTPATIENT
Start: 2018-02-17 | End: 2018-02-16

## 2018-02-16 RX ORDER — POTASSIUM CHLORIDE 20 MEQ/1
20 TABLET, EXTENDED RELEASE ORAL DAILY
Status: DISCONTINUED | OUTPATIENT
Start: 2018-02-16 | End: 2018-02-19 | Stop reason: HOSPADM

## 2018-02-16 RX ORDER — CEPHALEXIN 500 MG/1
500 CAPSULE ORAL EVERY 6 HOURS
Status: DISCONTINUED | OUTPATIENT
Start: 2018-02-16 | End: 2018-02-19 | Stop reason: HOSPADM

## 2018-02-16 RX ORDER — POTASSIUM CHLORIDE 20 MEQ/1
40 TABLET, EXTENDED RELEASE ORAL ONCE
Status: COMPLETED | OUTPATIENT
Start: 2018-02-16 | End: 2018-02-16

## 2018-02-16 RX ADMIN — AMPICILLIN SODIUM AND SULBACTAM SODIUM 3 G: 2; 1 INJECTION, POWDER, FOR SOLUTION INTRAMUSCULAR; INTRAVENOUS at 05:08

## 2018-02-16 RX ADMIN — STANDARDIZED SENNA CONCENTRATE AND DOCUSATE SODIUM 2 TABLET: 8.6; 5 TABLET, FILM COATED ORAL at 09:51

## 2018-02-16 RX ADMIN — ATORVASTATIN CALCIUM 40 MG: 40 TABLET, FILM COATED ORAL at 21:18

## 2018-02-16 RX ADMIN — HEPARIN SODIUM 5000 UNITS: 5000 INJECTION, SOLUTION INTRAVENOUS; SUBCUTANEOUS at 21:18

## 2018-02-16 RX ADMIN — POTASSIUM CHLORIDE 20 MEQ: 1500 TABLET, EXTENDED RELEASE ORAL at 17:01

## 2018-02-16 RX ADMIN — FUROSEMIDE 20 MG: 20 TABLET ORAL at 17:01

## 2018-02-16 RX ADMIN — LEVOTHYROXINE SODIUM 50 MCG: 50 TABLET ORAL at 05:47

## 2018-02-16 RX ADMIN — CLINDAMYCIN IN 5 PERCENT DEXTROSE 600 MG: 12 INJECTION, SOLUTION INTRAVENOUS at 05:45

## 2018-02-16 RX ADMIN — HEPARIN SODIUM 5000 UNITS: 5000 INJECTION, SOLUTION INTRAVENOUS; SUBCUTANEOUS at 17:01

## 2018-02-16 RX ADMIN — CARVEDILOL 3.12 MG: 3.12 TABLET, FILM COATED ORAL at 17:01

## 2018-02-16 RX ADMIN — CEPHALEXIN 500 MG: 500 CAPSULE ORAL at 11:27

## 2018-02-16 RX ADMIN — HEPARIN SODIUM 5000 UNITS: 5000 INJECTION, SOLUTION INTRAVENOUS; SUBCUTANEOUS at 05:47

## 2018-02-16 RX ADMIN — ASPIRIN 81 MG: 81 TABLET, CHEWABLE ORAL at 09:50

## 2018-02-16 RX ADMIN — OXYCODONE HYDROCHLORIDE 10 MG: 10 TABLET ORAL at 09:51

## 2018-02-16 RX ADMIN — CEPHALEXIN 500 MG: 500 CAPSULE ORAL at 17:01

## 2018-02-16 RX ADMIN — POTASSIUM CHLORIDE 40 MEQ: 1500 TABLET, EXTENDED RELEASE ORAL at 09:51

## 2018-02-16 RX ADMIN — STANDARDIZED SENNA CONCENTRATE AND DOCUSATE SODIUM 2 TABLET: 8.6; 5 TABLET, FILM COATED ORAL at 21:18

## 2018-02-16 RX ADMIN — CARVEDILOL 3.12 MG: 3.12 TABLET, FILM COATED ORAL at 09:51

## 2018-02-16 ASSESSMENT — ENCOUNTER SYMPTOMS
BACK PAIN: 0
DIZZINESS: 0
HEADACHES: 0
DEPRESSION: 0
ABDOMINAL PAIN: 0
WEAKNESS: 0
PALPITATIONS: 0
NAUSEA: 0
VOMITING: 0
MYALGIAS: 0
SHORTNESS OF BREATH: 1
CHILLS: 0
SORE THROAT: 0
FOCAL WEAKNESS: 0
COUGH: 0
HALLUCINATIONS: 0
BLOOD IN STOOL: 0
FEVER: 0
DIARRHEA: 0
HEARTBURN: 0

## 2018-02-16 ASSESSMENT — PAIN SCALES - GENERAL
PAINLEVEL_OUTOF10: 7
PAINLEVEL_OUTOF10: 2
PAINLEVEL_OUTOF10: 2
PAINLEVEL_OUTOF10: 0

## 2018-02-16 NOTE — HEART FAILURE PROGRAM
Cardiovascular Nurse Navigator () Progress Note:    Per discussion with Dr. Navarro on 2/16, heart failure is ruled out for Mr. Polanco. Therefore, seven day HF f/u and HF education are not indicated.    Thank you and please call with questions, Carli

## 2018-02-16 NOTE — ASSESSMENT & PLAN NOTE
Due to pulmonary edema vs elevated pulmonary pressures (at baseline) related to? CUAUHTEMOC.  +LE edema, but no e/o HF on echo  -Still requiring 1L  -Recommend outpatient sleep study  -He has no evidence of pneumonia, no wheezing on exam  -Continue Lasix  -D-Dimer, elevated: VQ ordered (Cr slightly too high for CTA)  -Incentive spirometer

## 2018-02-16 NOTE — PROGRESS NOTES
Report received at 7:00 AM from NOC MATT Barros, pt is resting in bed, awake and alert with no report of pain or discomfort. Pt is hard of hearing, no hearing aids available at the bed side. Bed is locked in lowest position with call light, belongings within reach, white board updated, and POC discussed. All needs met at this time.

## 2018-02-16 NOTE — PROGRESS NOTES
[facility] Hospitalist Progress Note    Date of Service: 2/15/2018    Chief Complaint  82 y.o. Estonian-speaking male with a history of coronary artery disease based on chart review, large of any edema admitted 2018 with worsening swelling and a left leg wound found to have cellulitis    Interval Problem Update  2/15: Wound care consulted, tolerating Unasyn and clindamycin.  Low-dose Lasix started.  Echo without significant abnormality    Consultants/Specialty  Wound care    Disposition  Follow-up creatinine and wound care in the morning  Mobilize        Review of Systems   Constitutional: Negative for chills, fever and malaise/fatigue.   HENT: Negative for sore throat.    Respiratory: Negative for cough and shortness of breath.    Cardiovascular: Positive for leg swelling. Negative for chest pain and palpitations.   Gastrointestinal: Negative for abdominal pain, blood in stool, diarrhea, heartburn, nausea and vomiting.   Genitourinary: Negative for dysuria and frequency.   Musculoskeletal: Negative for back pain and myalgias.   Skin:        Left leg wound, painful   Neurological: Negative for dizziness, focal weakness, weakness and headaches.   Psychiatric/Behavioral: Negative for depression and hallucinations.   All other systems reviewed and are negative.     Physical Exam  Laboratory/Imaging   Hemodynamics  Temp (24hrs), Av.9 °C (98.4 °F), Min:36.6 °C (97.8 °F), Max:37.4 °C (99.3 °F)   Temperature: 36.6 °C (97.8 °F)  Pulse  Av.5  Min: 60  Max: 95 Heart Rate (Monitored): 71  Blood Pressure : 117/66, NIBP: 132/64      Respiratory      Respiration: 18, Pulse Oximetry: 93 %, O2 Daily Delivery Respiratory : Nasal Cannula        RUL Breath Sounds: Clear, RML Breath Sounds: Clear, RLL Breath Sounds: Clear, RISA Breath Sounds: Clear, LLL Breath Sounds: Clear    Fluids    Intake/Output Summary (Last 24 hours) at 02/15/18 1704  Last data filed at 02/15/18 1427   Gross per 24 hour   Intake                0 ml    Output             2920 ml   Net            -2920 ml       Nutrition  Orders Placed This Encounter   Procedures   • Diet Order     Standing Status:   Standing     Number of Occurrences:   1     Order Specific Question:   Diet:     Answer:   Diabetic [3]     Physical Exam   Constitutional: He is oriented to person, place, and time. He appears well-developed and well-nourished. No distress.   HENT:   Head: Normocephalic.   Mouth/Throat: No oropharyngeal exudate.   Eyes: Conjunctivae are normal. Pupils are equal, round, and reactive to light.   Neck: Normal range of motion. No tracheal deviation present.   Cardiovascular: Normal rate and regular rhythm.    No murmur heard.  Pulmonary/Chest: Effort normal. No respiratory distress. He has no wheezes. He exhibits no tenderness.   Abdominal: Soft. He exhibits no distension. There is no tenderness. There is no guarding.   Musculoskeletal: Normal range of motion. He exhibits no edema or tenderness.   Lymphadenopathy:     He has no cervical adenopathy.   Neurological: He is alert and oriented to person, place, and time. No cranial nerve deficit.   Skin: No rash noted. There is erythema.   Large blister on the left lower shin with sloughing of skin, no purulence, surrounding erythema is present   Psychiatric:   Blunt, language barrier, poor insight   Nursing note and vitals reviewed.      Recent Labs      02/14/18   1515  02/15/18   0309   WBC  8.9  8.8   RBC  5.09  4.98   HEMOGLOBIN  12.8*  12.6*   HEMATOCRIT  42.8  41.2*   MCV  84.1  82.7   MCH  25.1*  25.3*   MCHC  29.9*  30.6*   RDW  51.3*  49.7   PLATELETCT  179  144*   MPV  10.4  9.8     Recent Labs      02/14/18   1515  02/15/18   0310   SODIUM  139  139   POTASSIUM  4.4  4.3   CHLORIDE  108  105   CO2  22  23   GLUCOSE  91  98   BUN  19  19   CREATININE  1.06  1.35   CALCIUM  9.3  8.8     Recent Labs      02/14/18   1515   APTT  31.5   INR  1.10     Recent Labs      02/14/18   1515   BNPBTYPENAT  141*               Assessment/Plan     * Cellulitis   Assessment & Plan    Clindamycin and Unasyn, change to by mouth in a.m.  Wound care  X ray negative for osteo  A1c pending   Blood and wound culture pending        CAD in native artery- (present on admission)   Assessment & Plan    Hx of 2 stents, appears not taking meds for this at home.  Language barrier  No active chest pain   Trop negative and EKG no changes   Crackles and edema  Repeat echo  Add ASA , statin, BB  No indication for Luba 10 or ACE inhibitor based on ejection fraction, we will avoid starting these due to patient's limited capacity to remember taking medications  Lasix 20 mg IV daily         Acute on chronic diastolic congestive heart failure (CMS-HCC)   Assessment & Plan    Mild shortness of breath, bilateral pitting edema, , CXR shows some interstitial edema   Started on IV lasix 20 mg daily, changed to by mouth soon. He has not been compliant due to poor insight and lack of funding  Strict I/O  Fluid and salt restriction  Repeat 2D echo, essentially normal, no significant change.          Hypothyroidism- (present on admission)   Assessment & Plan    Repeat TSH was elevated  Start on levothyroxine 50        Benign essential HTN- (present on admission)   Assessment & Plan    130s/80s  No meds at home  Follow up.           Quality-Core Measures

## 2018-02-16 NOTE — PROGRESS NOTES
Received report and care assumed. Patient A&Ox 4. Pt. Reports no pain. Work of breathing even and unlabored on 2L NC. Discussed POC. Call light within reach and pt. instructed to call when in need of assistance.  Denies any other needs at this time.

## 2018-02-16 NOTE — WOUND TEAM
[facility]  Inpatient Services  Initial Wound & Skin Care Evaluation    Admission Date:   2/14/18  Consult Date:    2/15/18  HPI, PMH, SH: Reviewed  Unit where seen by Wound Team: T 8    WOUND CONSULT RELATED TO: left leg wound    SUBJECTIVE:  Daughter states her father always has swollen legs    Self Report / Pain Level: 10/10 with wound cleansing    OBJECTIVE: Pt in bed.  Able to walk to bathroom with CG A  WOUND TYPE, LOCATION, CHARACTERISTICS (Pressure ulcers: location, stage, POA or date identified)    Wound Type/Location:  Left leg - gaiter area    Periwound:    Erythema - satellite wound with purulent blister, skin with vertical wrinkles  Drainage:     Min serosanguinous  Tissue Type and %:    100% red dermis  Wound Edges:    Flat and attached  Odor:     none  Exposed structure(s):   none  S&S of Infection:    erythema  Measurements:   2/15/18  Length:    7cm   Width:     8.5cm   Depth:     PTW   Tracts/undermining:   None  Satellite 1.2x1.5xPTW      INTERVENTIONS BY WOUND TEAM: Dressing removed (Kerlex and xeroform)  Satellite blister not intact so deroofed with scissors and tweezers.  Area cleansed with normal saline, hydrofera blue on all open area, covered with ABD, secured with kerlex and tubi G placed over for minimal compression   Dressing types: hydrofera blue  Patient/family educated on rationale for plan of care_X___  Interdisciplinary Collaboration: RN, student RN, daughter    EVALUATION: Pt with area of blister after an episode of retaining fluid although the vertical wrinkles indicate a recent significant loss of fluid.    Last Preston Score by RN:  Factors affecting wound healing: swelling, CHF, CAD  Goals: decrease size of wound by 20% in 2 weeks    NURSING PLAN OF CARE: (X)  Dressing changes: See Dressing Maintenance orders: X  Skin care: See Skin Care orders:   Rectal tube care: See Rectal Tube Care orders:   Other orders:    RSKIN: CURRENT (X) ORDERED (O)  Q shift Preston:   X  Q shift pressure point assessments:  X  Atmosair        JEREMI      Bariatric JEREMI      Bariatric foam        Heel float boots       Heels floated on pillows      Barrier wipes      Barrier Cream      Barrier paste      Sacral silicone dressing      Silicone O2 tubing      Anchorfast      Trach with Optifoam split foam       Waffle cushion      Rectal tube or BMS      Antifungal tx    Turn q 2 hours   Up to chair    Ambulate   PT/OT     Dietician      PO     TF   TPN     PVN    NPO   # days   Other       WOUND TEAM PLAN OF CARE (X):   NPWT change 3 x week:        Dressing changes by wound team:       Follow up as needed:     X  Other (explain):    Anticipated discharge plans (X):  SNF:           Home Care:           Outpatient Wound Center:            Self Care:            Other:         tbd

## 2018-02-16 NOTE — PROGRESS NOTES
[facility] Hospitalist Progress Note    Date of Service: 2018    Chief Complaint  82 y.o. Macedonian-speaking male with a history of coronary artery disease based on chart review, large of any edema admitted 2018 with worsening swelling and a left leg wound found to have cellulitis    Interval Problem Update  2/15: Wound care consulted, tolerating Unasyn and clindamycin.  Low-dose Lasix started.  Echo without significant abnormality   : Discussed with family at bedside. Continue diuresis, ambulate, wean O2. Recommend outpatient sleep study. Change to by mouth Keflex      Consultants/Specialty  Wound care    Disposition  Follow-up O2 sats in a.m.  Mobilize        Review of Systems   Constitutional: Negative for chills, fever and malaise/fatigue.   HENT: Negative for sore throat.    Respiratory: Positive for shortness of breath (with exertion). Negative for cough.    Cardiovascular: Positive for leg swelling. Negative for chest pain and palpitations.   Gastrointestinal: Negative for abdominal pain, blood in stool, diarrhea, heartburn, nausea and vomiting.   Genitourinary: Negative for dysuria and frequency.   Musculoskeletal: Negative for back pain and myalgias.   Skin:        Left leg wound, painful   Neurological: Negative for dizziness, focal weakness, weakness and headaches.   Psychiatric/Behavioral: Negative for depression and hallucinations.   All other systems reviewed and are negative.     Physical Exam  Laboratory/Imaging   Hemodynamics  Temp (24hrs), Av.8 °C (98.2 °F), Min:36.5 °C (97.7 °F), Max:37.2 °C (98.9 °F)   Temperature: 36.7 °C (98 °F)  Pulse  Av.6  Min: 60  Max: 95    Blood Pressure : 114/67      Respiratory      Respiration: 18, Pulse Oximetry: 90 %        RUL Breath Sounds: Clear, RML Breath Sounds: Clear, RLL Breath Sounds: Clear, RISA Breath Sounds: Clear, LLL Breath Sounds: Clear    Fluids    Intake/Output Summary (Last 24 hours) at 18 5322  Last data filed at 18  1200   Gross per 24 hour   Intake                0 ml   Output              950 ml   Net             -950 ml       Nutrition  Orders Placed This Encounter   Procedures   • Diet Order     Standing Status:   Standing     Number of Occurrences:   1     Order Specific Question:   Diet:     Answer:   Diabetic [3]     Physical Exam   Constitutional: He is oriented to person, place, and time. He appears well-developed and well-nourished. No distress.   HENT:   Head: Normocephalic.   Mouth/Throat: No oropharyngeal exudate.   Eyes: Conjunctivae are normal. Pupils are equal, round, and reactive to light.   Neck: Normal range of motion. No tracheal deviation present.   Cardiovascular: Normal rate and regular rhythm.    No murmur heard.  Pulmonary/Chest: Effort normal. No respiratory distress. He has no wheezes. He exhibits no tenderness.   Pain crackles at bilateral bases   Abdominal: Soft. He exhibits no distension. There is no tenderness. There is no guarding.   Musculoskeletal: Normal range of motion. He exhibits no edema or tenderness.   Lymphadenopathy:     He has no cervical adenopathy.   Neurological: He is alert and oriented to person, place, and time. No cranial nerve deficit.   Skin: No rash noted. There is erythema.   Large blister on the left lower shin with sloughing of skin, no purulence, surrounding erythema is present   Nursing note and vitals reviewed.      Recent Labs      02/14/18   1515  02/15/18   0309  02/16/18   0224   WBC  8.9  8.8  6.3   RBC  5.09  4.98  4.53*   HEMOGLOBIN  12.8*  12.6*  11.7*   HEMATOCRIT  42.8  41.2*  37.2*   MCV  84.1  82.7  82.1   MCH  25.1*  25.3*  25.8*   MCHC  29.9*  30.6*  31.5*   RDW  51.3*  49.7  49.1   PLATELETCT  179  144*  142*   MPV  10.4  9.8  10.0     Recent Labs      02/14/18   1515  02/15/18   0310  02/16/18   0224   SODIUM  139  139  136   POTASSIUM  4.4  4.3  3.5*   CHLORIDE  108  105  106   CO2  22  23  21   GLUCOSE  91  98  88   BUN  19  19  25*   CREATININE  1.06   1.35  1.28   CALCIUM  9.3  8.8  8.4*     Recent Labs      02/14/18   1515   APTT  31.5   INR  1.10     Recent Labs      02/14/18   1515   BNPBTYPENAT  141*              Assessment/Plan     * Cellulitis   Assessment & Plan    Change to keflex.  Wound care  X ray negative for osteo  A1c pending   Blood and wound culture pending        CAD in native artery- (present on admission)   Assessment & Plan    Hx of 2 stents, appears not taking meds for this at home.  Language barrier, Crackles and edema  No active chest pain   Trop negative and EKG no changes   Repeat echo  Add ASA , statin, BB  No indication for Spironolactone or ACE inhibitor based on ejection fraction, we will avoid starting these due to patient's limited capacity to remember taking medications  Lasix 20 mg PO BID        Acute respiratory failure with hypoxia (CMS-[facility])   Assessment & Plan    May be due to elevated pulmonary pressures related to? CUAUHTEMOC  -Recommend outpatient sleep study  -He has no evidence of pneumonia, no wheezing on exam  -Became hypoxic in the low 80s on 1 L, required 2 L to stay in the 90s.  -Continue Lasix  -Incentive spirometer        Bilateral lower extremity edema   Assessment & Plan    Mild shortness of breath, bilateral pitting edema, , CXR shows some interstitial edema but echo was normal with no e/o heart failure.  Started on IV lasix 20 mg daily, change to 20 PO BID  He has not been compliant due to poor insight and lack of funding  Strict I/O  Fluid and salt restriction  Repeat 2D echo, essentially normal, no significant change.          Hypothyroidism- (present on admission)   Assessment & Plan    Repeat TSH was elevated  Start on levothyroxine 50        Benign essential HTN- (present on admission)   Assessment & Plan    130s/80s  No meds at home  Follow up.           Quality-Core Measures    35 minutes were spent discussing course and plan with family at bedside, this was in addition to the time of the initial visit  with the patient for total time of 65min.  All questions answered.  Greater than 50% of this time was at the patient's bedside.

## 2018-02-16 NOTE — FACE TO FACE
Face to Face Supporting Documentation - Home Health    The encounter with this patient was in whole or in part the primary reason for home health admission.    Date of encounter:   Patient:                    MRN:                       YOB: 2018  Jose Polanco  [id number]  1935     Home health to see patient for:  Skilled Nursing care for assessment, interventions & education, Wound Care, Registered dietitian consult, Medical social work consult, Home health aide, Physical Therapy evaluation and treatment and Occupational therapy evaluation and treatment    Skilled need for:  Medication Management Multiple meds, LLE wound    Skilled nursing interventions to include:  Wound Care    Homebound status evidenced by:  Need the aid of supportive devices such as crutches, canes, wheelchairs or walkers. Leaving home requires a considerable and taxing effort. There is a normal inability to leave the home.    Community Physician to provide follow up care: Marta Villarreal M.D.     Optional Interventions? No      I certify the face to face encounter for this home health care referral meets the CMS requirements and the encounter/clinical assessment with the patient was, in whole, or in part, for the medical condition(s) listed above, which is the primary reason for home health care. Based on my clinical findings: the service(s) are medically necessary, support the need for home health care, and the homebound criteria are met.  I certify that this patient has had a face to face encounter by myself.  Aura Navarro D.O. - MILLERI: [phone]

## 2018-02-17 ENCOUNTER — APPOINTMENT (OUTPATIENT)
Dept: RADIOLOGY | Facility: MEDICAL CENTER | Age: 83
DRG: 602 | End: 2018-02-17
Attending: INTERNAL MEDICINE
Payer: COMMERCIAL

## 2018-02-17 LAB
ALBUMIN SERPL BCP-MCNC: 3.4 G/DL (ref 3.2–4.9)
BACTERIA WND AEROBE CULT: ABNORMAL
BACTERIA WND AEROBE CULT: ABNORMAL
BUN SERPL-MCNC: 23 MG/DL (ref 8–22)
CALCIUM SERPL-MCNC: 8.5 MG/DL (ref 8.5–10.5)
CHLORIDE SERPL-SCNC: 103 MMOL/L (ref 96–112)
CO2 SERPL-SCNC: 22 MMOL/L (ref 20–33)
CREAT SERPL-MCNC: 1.31 MG/DL (ref 0.5–1.4)
DEPRECATED D DIMER PPP IA-ACNC: 444 NG/ML(D-DU)
GLUCOSE SERPL-MCNC: 95 MG/DL (ref 65–99)
GRAM STN SPEC: ABNORMAL
PHOSPHATE SERPL-MCNC: 3 MG/DL (ref 2.5–4.5)
POTASSIUM SERPL-SCNC: 4.6 MMOL/L (ref 3.6–5.5)
SIGNIFICANT IND 70042: ABNORMAL
SITE SITE: ABNORMAL
SODIUM SERPL-SCNC: 130 MMOL/L (ref 135–145)
SOURCE SOURCE: ABNORMAL

## 2018-02-17 PROCEDURE — 99232 SBSQ HOSP IP/OBS MODERATE 35: CPT | Performed by: INTERNAL MEDICINE

## 2018-02-17 PROCEDURE — 71045 X-RAY EXAM CHEST 1 VIEW: CPT

## 2018-02-17 PROCEDURE — 80069 RENAL FUNCTION PANEL: CPT

## 2018-02-17 PROCEDURE — 85379 FIBRIN DEGRADATION QUANT: CPT

## 2018-02-17 PROCEDURE — 700111 HCHG RX REV CODE 636 W/ 250 OVERRIDE (IP): Performed by: INTERNAL MEDICINE

## 2018-02-17 PROCEDURE — 36415 COLL VENOUS BLD VENIPUNCTURE: CPT

## 2018-02-17 PROCEDURE — 700102 HCHG RX REV CODE 250 W/ 637 OVERRIDE(OP): Performed by: INTERNAL MEDICINE

## 2018-02-17 PROCEDURE — A9270 NON-COVERED ITEM OR SERVICE: HCPCS | Performed by: INTERNAL MEDICINE

## 2018-02-17 PROCEDURE — 770020 HCHG ROOM/CARE - TELE (206)

## 2018-02-17 RX ORDER — FUROSEMIDE 10 MG/ML
20 INJECTION INTRAMUSCULAR; INTRAVENOUS
Status: DISCONTINUED | OUTPATIENT
Start: 2018-02-17 | End: 2018-02-17

## 2018-02-17 RX ORDER — FUROSEMIDE 10 MG/ML
20 INJECTION INTRAMUSCULAR; INTRAVENOUS
Status: DISCONTINUED | OUTPATIENT
Start: 2018-02-18 | End: 2018-02-18

## 2018-02-17 RX ADMIN — ASPIRIN 81 MG: 81 TABLET, CHEWABLE ORAL at 08:09

## 2018-02-17 RX ADMIN — POTASSIUM CHLORIDE 20 MEQ: 1500 TABLET, EXTENDED RELEASE ORAL at 08:09

## 2018-02-17 RX ADMIN — STANDARDIZED SENNA CONCENTRATE AND DOCUSATE SODIUM 2 TABLET: 8.6; 5 TABLET, FILM COATED ORAL at 08:10

## 2018-02-17 RX ADMIN — FUROSEMIDE 20 MG: 10 INJECTION, SOLUTION INTRAMUSCULAR; INTRAVENOUS at 10:00

## 2018-02-17 RX ADMIN — STANDARDIZED SENNA CONCENTRATE AND DOCUSATE SODIUM 2 TABLET: 8.6; 5 TABLET, FILM COATED ORAL at 21:27

## 2018-02-17 RX ADMIN — CEPHALEXIN 500 MG: 500 CAPSULE ORAL at 23:13

## 2018-02-17 RX ADMIN — CEPHALEXIN 500 MG: 500 CAPSULE ORAL at 00:04

## 2018-02-17 RX ADMIN — FUROSEMIDE 20 MG: 20 TABLET ORAL at 06:49

## 2018-02-17 RX ADMIN — HEPARIN SODIUM 5000 UNITS: 5000 INJECTION, SOLUTION INTRAVENOUS; SUBCUTANEOUS at 21:27

## 2018-02-17 RX ADMIN — LEVOTHYROXINE SODIUM 50 MCG: 50 TABLET ORAL at 06:48

## 2018-02-17 RX ADMIN — CARVEDILOL 3.12 MG: 3.12 TABLET, FILM COATED ORAL at 17:04

## 2018-02-17 RX ADMIN — CEPHALEXIN 500 MG: 500 CAPSULE ORAL at 06:49

## 2018-02-17 RX ADMIN — HEPARIN SODIUM 5000 UNITS: 5000 INJECTION, SOLUTION INTRAVENOUS; SUBCUTANEOUS at 06:49

## 2018-02-17 RX ADMIN — CARVEDILOL 3.12 MG: 3.12 TABLET, FILM COATED ORAL at 08:10

## 2018-02-17 RX ADMIN — CEPHALEXIN 500 MG: 500 CAPSULE ORAL at 11:54

## 2018-02-17 RX ADMIN — HEPARIN SODIUM 5000 UNITS: 5000 INJECTION, SOLUTION INTRAVENOUS; SUBCUTANEOUS at 14:38

## 2018-02-17 RX ADMIN — CEPHALEXIN 500 MG: 500 CAPSULE ORAL at 17:04

## 2018-02-17 RX ADMIN — ATORVASTATIN CALCIUM 40 MG: 40 TABLET, FILM COATED ORAL at 21:27

## 2018-02-17 ASSESSMENT — ENCOUNTER SYMPTOMS
ABDOMINAL PAIN: 0
COUGH: 0
FEVER: 0
DIZZINESS: 0
HALLUCINATIONS: 0
PALPITATIONS: 0
CHILLS: 1
FOCAL WEAKNESS: 0
DEPRESSION: 0
BACK PAIN: 0
HEARTBURN: 0
MYALGIAS: 0
HEADACHES: 0
SORE THROAT: 0
WEAKNESS: 0
VOMITING: 0
BLOOD IN STOOL: 0
DIARRHEA: 0
NAUSEA: 0
SHORTNESS OF BREATH: 1

## 2018-02-17 ASSESSMENT — PAIN SCALES - GENERAL
PAINLEVEL_OUTOF10: 5
PAINLEVEL_OUTOF10: 0
PAINLEVEL_OUTOF10: 5
PAINLEVEL_OUTOF10: 0

## 2018-02-17 NOTE — PROGRESS NOTES
Bedside report received. Per night RN pt quickly desats to mid 80's with ambulation, still requiring 2 L via [place] Patient A&O x 4. Lao speaking only, Glenn GUTIERREZ at bedside to help translate morning assessment. Complains of LLE 5/10 pain will medicate per MAR. Dyspnea on exertion. Patient's sodium dropped from 136 to 130 since yesterday. Currently Saline locked. POC discussed with patient. Pt verbalizes understanding. Call light and belongings with in reach. Bed locked and in lowest position, alarm and fall precautions in place.

## 2018-02-17 NOTE — CARE PLAN
Problem: Safety  Goal: Will remain free from injury  Bed locked and in lowest position. Bed alarm on. Treaded socks. Call light and belongings within reach. Patient educated to call for assistance. Pt verbalized understanding. Hourly rounding in place.     Problem: Knowledge Deficit  Goal: Knowledge of disease process/condition, treatment plan, diagnostic tests, and medications will improve  Discussed POC and medications with patient, pt. verbalized understanding.

## 2018-02-17 NOTE — CARE PLAN
Problem: Communication  Goal: The ability to communicate needs accurately and effectively will improve  Outcome: PROGRESSING SLOWER THAN EXPECTED  Patient Italian speaking only    Problem: Safety  Goal: Will remain free from injury  Outcome: PROGRESSING AS EXPECTED

## 2018-02-17 NOTE — PROGRESS NOTES
Assumed care of patient, denies needs at this time, call light within each, bed in lowest position family at bedside.

## 2018-02-18 PROBLEM — D64.9 NORMOCYTIC ANEMIA: Status: ACTIVE | Noted: 2018-02-18

## 2018-02-18 LAB
ALBUMIN SERPL BCP-MCNC: 3.2 G/DL (ref 3.2–4.9)
BUN SERPL-MCNC: 21 MG/DL (ref 8–22)
CALCIUM SERPL-MCNC: 8.4 MG/DL (ref 8.5–10.5)
CHLORIDE SERPL-SCNC: 106 MMOL/L (ref 96–112)
CO2 SERPL-SCNC: 18 MMOL/L (ref 20–33)
CREAT SERPL-MCNC: 1 MG/DL (ref 0.5–1.4)
GLUCOSE SERPL-MCNC: 87 MG/DL (ref 65–99)
PHOSPHATE SERPL-MCNC: 3.4 MG/DL (ref 2.5–4.5)
POTASSIUM SERPL-SCNC: 4 MMOL/L (ref 3.6–5.5)
SODIUM SERPL-SCNC: 136 MMOL/L (ref 135–145)

## 2018-02-18 PROCEDURE — 36415 COLL VENOUS BLD VENIPUNCTURE: CPT

## 2018-02-18 PROCEDURE — G8979 MOBILITY GOAL STATUS: HCPCS | Mod: CI

## 2018-02-18 PROCEDURE — 700111 HCHG RX REV CODE 636 W/ 250 OVERRIDE (IP)

## 2018-02-18 PROCEDURE — A9270 NON-COVERED ITEM OR SERVICE: HCPCS | Performed by: INTERNAL MEDICINE

## 2018-02-18 PROCEDURE — 99232 SBSQ HOSP IP/OBS MODERATE 35: CPT | Performed by: INTERNAL MEDICINE

## 2018-02-18 PROCEDURE — A6209 FOAM DRSG <=16 SQ IN W/O BDR: HCPCS | Performed by: INTERNAL MEDICINE

## 2018-02-18 PROCEDURE — 80069 RENAL FUNCTION PANEL: CPT

## 2018-02-18 PROCEDURE — 97161 PT EVAL LOW COMPLEX 20 MIN: CPT

## 2018-02-18 PROCEDURE — 770020 HCHG ROOM/CARE - TELE (206)

## 2018-02-18 PROCEDURE — 700102 HCHG RX REV CODE 250 W/ 637 OVERRIDE(OP): Performed by: INTERNAL MEDICINE

## 2018-02-18 PROCEDURE — G8980 MOBILITY D/C STATUS: HCPCS | Mod: CI

## 2018-02-18 PROCEDURE — G8978 MOBILITY CURRENT STATUS: HCPCS | Mod: CI

## 2018-02-18 PROCEDURE — 700111 HCHG RX REV CODE 636 W/ 250 OVERRIDE (IP): Performed by: INTERNAL MEDICINE

## 2018-02-18 RX ORDER — FUROSEMIDE 10 MG/ML
20 INJECTION INTRAMUSCULAR; INTRAVENOUS
Status: DISCONTINUED | OUTPATIENT
Start: 2018-02-18 | End: 2018-02-18

## 2018-02-18 RX ORDER — FUROSEMIDE 10 MG/ML
20 INJECTION INTRAMUSCULAR; INTRAVENOUS
Status: DISCONTINUED | OUTPATIENT
Start: 2018-02-18 | End: 2018-02-19

## 2018-02-18 RX ORDER — TRAZODONE HYDROCHLORIDE 50 MG/1
50 TABLET ORAL
Status: DISCONTINUED | OUTPATIENT
Start: 2018-02-18 | End: 2018-02-19 | Stop reason: HOSPADM

## 2018-02-18 RX ORDER — FUROSEMIDE 10 MG/ML
INJECTION INTRAMUSCULAR; INTRAVENOUS
Status: COMPLETED
Start: 2018-02-18 | End: 2018-02-18

## 2018-02-18 RX ADMIN — STANDARDIZED SENNA CONCENTRATE AND DOCUSATE SODIUM 2 TABLET: 8.6; 5 TABLET, FILM COATED ORAL at 21:19

## 2018-02-18 RX ADMIN — TRAZODONE HYDROCHLORIDE 50 MG: 50 TABLET ORAL at 21:19

## 2018-02-18 RX ADMIN — HEPARIN SODIUM 5000 UNITS: 5000 INJECTION, SOLUTION INTRAVENOUS; SUBCUTANEOUS at 06:15

## 2018-02-18 RX ADMIN — ATORVASTATIN CALCIUM 40 MG: 40 TABLET, FILM COATED ORAL at 21:19

## 2018-02-18 RX ADMIN — LEVOTHYROXINE SODIUM 50 MCG: 50 TABLET ORAL at 06:15

## 2018-02-18 RX ADMIN — CEPHALEXIN 500 MG: 500 CAPSULE ORAL at 17:23

## 2018-02-18 RX ADMIN — FUROSEMIDE 20 MG: 10 INJECTION, SOLUTION INTRAMUSCULAR; INTRAVENOUS at 17:22

## 2018-02-18 RX ADMIN — FUROSEMIDE 20 MG: 10 INJECTION, SOLUTION INTRAMUSCULAR; INTRAVENOUS at 10:30

## 2018-02-18 RX ADMIN — CEPHALEXIN 500 MG: 500 CAPSULE ORAL at 06:15

## 2018-02-18 RX ADMIN — CEPHALEXIN 500 MG: 500 CAPSULE ORAL at 23:11

## 2018-02-18 RX ADMIN — CARVEDILOL 3.12 MG: 3.12 TABLET, FILM COATED ORAL at 09:35

## 2018-02-18 RX ADMIN — STANDARDIZED SENNA CONCENTRATE AND DOCUSATE SODIUM 2 TABLET: 8.6; 5 TABLET, FILM COATED ORAL at 09:36

## 2018-02-18 RX ADMIN — HEPARIN SODIUM 5000 UNITS: 5000 INJECTION, SOLUTION INTRAVENOUS; SUBCUTANEOUS at 15:05

## 2018-02-18 RX ADMIN — CARVEDILOL 3.12 MG: 3.12 TABLET, FILM COATED ORAL at 17:23

## 2018-02-18 RX ADMIN — FUROSEMIDE 20 MG: 10 INJECTION, SOLUTION INTRAMUSCULAR; INTRAVENOUS at 09:40

## 2018-02-18 RX ADMIN — HEPARIN SODIUM 5000 UNITS: 5000 INJECTION, SOLUTION INTRAVENOUS; SUBCUTANEOUS at 21:20

## 2018-02-18 RX ADMIN — CEPHALEXIN 500 MG: 500 CAPSULE ORAL at 11:56

## 2018-02-18 RX ADMIN — POTASSIUM CHLORIDE 20 MEQ: 1500 TABLET, EXTENDED RELEASE ORAL at 09:36

## 2018-02-18 RX ADMIN — ASPIRIN 81 MG: 81 TABLET, CHEWABLE ORAL at 09:36

## 2018-02-18 ASSESSMENT — GAIT ASSESSMENTS
DEVIATION: INCREASED BASE OF SUPPORT
DISTANCE (FEET): 150
GAIT LEVEL OF ASSIST: SUPERVISED
ASSISTIVE DEVICE: FRONT WHEEL WALKER

## 2018-02-18 ASSESSMENT — ENCOUNTER SYMPTOMS
NAUSEA: 0
DIZZINESS: 0
FOCAL WEAKNESS: 0
SORE THROAT: 0
SHORTNESS OF BREATH: 1
DIARRHEA: 0
BACK PAIN: 0
HALLUCINATIONS: 0
BLOOD IN STOOL: 0
WEAKNESS: 0
HEADACHES: 0
ABDOMINAL PAIN: 0
COUGH: 0
DEPRESSION: 0
VOMITING: 0
MYALGIAS: 0
ROS SKIN COMMENTS: LEFT LEG WOUND
FEVER: 0
HEARTBURN: 0
CHILLS: 1
PALPITATIONS: 0

## 2018-02-18 ASSESSMENT — COGNITIVE AND FUNCTIONAL STATUS - GENERAL
WALKING IN HOSPITAL ROOM: A LITTLE
SUGGESTED CMS G CODE MODIFIER MOBILITY: CJ
MOBILITY SCORE: 21
STANDING UP FROM CHAIR USING ARMS: A LITTLE
CLIMB 3 TO 5 STEPS WITH RAILING: A LITTLE

## 2018-02-18 ASSESSMENT — PAIN SCALES - GENERAL
PAINLEVEL_OUTOF10: 0
PAINLEVEL_OUTOF10: 2
PAINLEVEL_OUTOF10: 0

## 2018-02-18 NOTE — PROGRESS NOTES
[facility] Hospitalist Progress Note    Date of Service: 2018    Chief Complaint  82 y.o. Serbian-speaking male with a history of coronary artery disease based on chart review, large of any edema admitted 2018 with worsening swelling and a left leg wound found to have cellulitis    Interval Problem Update  2/15: Wound care consulted, tolerating Unasyn and clindamycin.  Low-dose Lasix started.  Echo without significant abnormality   : Discussed with family at bedside. Continue diuresis, ambulate, wean O2. Recommend outpatient sleep study. Change to by mouth Keflex  : Still requiring oxygen, d-dimer ordered. Pending PT, IS ordered  : Weaned to 1L, ddimer high, VQ ordered.  Pending pt/ot      Consultants/Specialty  Wound care    Disposition  Follow-up O2 sats in a.m.  Mobilize  F/U VQ  Needs outpatient sleep study        Review of Systems   Constitutional: Positive for chills and malaise/fatigue. Negative for fever.   HENT: Negative for sore throat.    Respiratory: Positive for shortness of breath (with exertion). Negative for cough.    Cardiovascular: Positive for leg swelling. Negative for chest pain and palpitations.   Gastrointestinal: Negative for abdominal pain, blood in stool, diarrhea, heartburn, nausea and vomiting.   Genitourinary: Negative for dysuria and frequency.   Musculoskeletal: Negative for back pain and myalgias.   Skin:        Left leg wound   Neurological: Negative for dizziness, focal weakness, weakness and headaches.   Psychiatric/Behavioral: Negative for depression and hallucinations.   All other systems reviewed and are negative.     Physical Exam  Laboratory/Imaging   Hemodynamics  Temp (24hrs), Av.5 °C (97.7 °F), Min:36.1 °C (97 °F), Max:36.8 °C (98.2 °F)   Temperature: 36.7 °C (98.1 °F)  Pulse  Av.1  Min: 59  Max: 95    Blood Pressure : 102/54      Respiratory      Respiration: 16, Pulse Oximetry: 95 %        RUL Breath Sounds: Clear, RML Breath Sounds: Clear, RLL  Breath Sounds: Coarse Crackles, RISA Breath Sounds: Clear, LLL Breath Sounds: Coarse Crackles    Fluids    Intake/Output Summary (Last 24 hours) at 02/18/18 1421  Last data filed at 02/18/18 0623   Gross per 24 hour   Intake                0 ml   Output             1550 ml   Net            -1550 ml       Nutrition  Orders Placed This Encounter   Procedures   • Diet Order     Standing Status:   Standing     Number of Occurrences:   1     Order Specific Question:   Diet:     Answer:   Diabetic [3]     Physical Exam   Constitutional: He is oriented to person, place, and time. He appears well-developed and well-nourished. No distress.   HENT:   Head: Normocephalic.   Mouth/Throat: No oropharyngeal exudate.   Eyes: Conjunctivae are normal. Pupils are equal, round, and reactive to light.   Neck: Normal range of motion. No tracheal deviation present.   Cardiovascular: Normal rate and regular rhythm.    No murmur heard.  Pulmonary/Chest: Effort normal. No respiratory distress. He has no wheezes. He exhibits no tenderness.   Faint crackles at bilateral bases   Abdominal: Soft. He exhibits no distension. There is no tenderness. There is no guarding.   Musculoskeletal: Normal range of motion. He exhibits no edema or tenderness.   Lymphadenopathy:     He has no cervical adenopathy.   Neurological: He is alert and oriented to person, place, and time. No cranial nerve deficit.   Skin: No rash noted. There is erythema.   Large blister on the left lower shin with sloughing of skin, no purulence, surrounding erythema is present   Nursing note and vitals reviewed.      Recent Labs      02/16/18 0224   WBC  6.3   RBC  4.53*   HEMOGLOBIN  11.7*   HEMATOCRIT  37.2*   MCV  82.1   MCH  25.8*   MCHC  31.5*   RDW  49.1   PLATELETCT  142*   MPV  10.0     Recent Labs      02/16/18   0224  02/17/18   0137  02/18/18   0219   SODIUM  136  130*  136   POTASSIUM  3.5*  4.6  4.0   CHLORIDE  106  103  106   CO2  21  22  18*   GLUCOSE  88  95  87    BUN  25*  23*  21   CREATININE  1.28  1.31  1.00   CALCIUM  8.4*  8.5  8.4*                      Assessment/Plan     * Cellulitis   Assessment & Plan    Keflex.  Wound care  X ray negative for osteo  A1c pending   Blood and wound culture pending        CAD in native artery- (present on admission)   Assessment & Plan    Hx of 2 stents, appears not taking meds for this at home.  Language barrier, Crackles and edema but no e/o CHF on echo (?PAH)  No active chest pain   Trop negative and EKG no changes   Add ASA , statin, BB  No indication for Spironolactone or ACE inhibitor based on ejection fraction, we will avoid starting these due to patient's limited capacity to remember taking medications  Lasix 20 mg IV BID        Normocytic anemia   Assessment & Plan    No e/o bleeding, check iron studies        Acute respiratory failure with hypoxia (CMS-HCC)   Assessment & Plan    Due to pulmonary edema vs elevated pulmonary pressures (at baseline) related to? CUAUHTEMOC.  +LE edema, but no e/o HF on echo  -Still requiring 1L  -Recommend outpatient sleep study  -He has no evidence of pneumonia, no wheezing on exam  -Continue Lasix  -D-Dimer, elevated: VQ ordered (Cr slightly too high for CTA)  -Incentive spirometer        Bilateral lower extremity edema   Assessment & Plan    Mild shortness of breath, bilateral pitting edema, , CXR shows some interstitial edema but echo was normal with no e/o heart failure.  Started on IV lasix 20 mg daily, continue  He has not been compliant due to poor insight and lack of funding  Strict I/O  Fluid and salt restriction  Repeat 2D echo, essentially normal, no significant change.          Hypothyroidism- (present on admission)   Assessment & Plan    Repeat TSH was elevated  Start on levothyroxine 50        Hyponatremia- (present on admission)   Assessment & Plan    Likely due to diuretics.  -Repeat in a.m.        Benign essential HTN- (present on admission)   Assessment & Plan     130s/80s  No meds at home  Follow up.           Quality-Core Measures

## 2018-02-18 NOTE — CARE PLAN
Problem: Safety  Goal: Will remain free from injury  Bed locked and in lowest position. Bed alarm on. Treaded socks on. Call light and belongings within reach. Patient educated to call for assistance. Pt verbalized understanding. Hourly rounding in place.     Problem: Knowledge Deficit  Goal: Knowledge of disease process/condition, treatment plan, diagnostic tests, and medications will improve  Discussed POC and medications with patient, pt. verbalized understanding.

## 2018-02-18 NOTE — CARE PLAN
Problem: Respiratory:  Goal: Respiratory status will improve  Outcome: PROGRESSING SLOWER THAN EXPECTED  Patient remains on o2    Problem: Pain Management  Goal: Pain level will decrease to patient's comfort goal  Outcome: PROGRESSING AS EXPECTED  Patient states pain is getting better

## 2018-02-18 NOTE — PROGRESS NOTES
Bedside report received. No overnight events. Patient A&O x 4. Currently on 1 L via [place] desats with exertion to mid to low 80's. Coarse crackles at bases. ROJAS Flores at bedside to assist with translating. No complaints of pain or SOB at this time. POC discussed with patient. Pt verbalizes understanding. Call light and belongings with in reach. Bed locked and in lowest position, alarm and fall precautions in place.

## 2018-02-18 NOTE — PROGRESS NOTES
Assumed care of patient, denies needs at this time, call light within reach, bed in lowest position.

## 2018-02-18 NOTE — PROGRESS NOTES
[facility] Hospitalist Progress Note    Date of Service: 2018    Chief Complaint  82 y.o. Turkish-speaking male with a history of coronary artery disease based on chart review, large of any edema admitted 2018 with worsening swelling and a left leg wound found to have cellulitis    Interval Problem Update  2/15: Wound care consulted, tolerating Unasyn and clindamycin.  Low-dose Lasix started.  Echo without significant abnormality   : Discussed with family at bedside. Continue diuresis, ambulate, wean O2. Recommend outpatient sleep study. Change to by mouth Keflex  : Still requiring oxygen, d-dimer ordered. Pending PT, INRs ordered      Consultants/Specialty  Wound care    Disposition  Follow-up O2 sats in a.m.  Mobilize        Review of Systems   Constitutional: Positive for chills and malaise/fatigue. Negative for fever.   HENT: Negative for sore throat.    Respiratory: Positive for shortness of breath (with exertion). Negative for cough.    Cardiovascular: Positive for leg swelling. Negative for chest pain and palpitations.   Gastrointestinal: Negative for abdominal pain, blood in stool, diarrhea, heartburn, nausea and vomiting.   Genitourinary: Negative for dysuria and frequency.   Musculoskeletal: Negative for back pain and myalgias.   Skin:        Left leg wound, painful   Neurological: Negative for dizziness, focal weakness, weakness and headaches.   Psychiatric/Behavioral: Negative for depression and hallucinations.   All other systems reviewed and are negative.     Physical Exam  Laboratory/Imaging   Hemodynamics  Temp (24hrs), Av.5 °C (97.7 °F), Min:35.9 °C (96.6 °F), Max:37.3 °C (99.1 °F)   Temperature: 36.2 °C (97.1 °F)  Pulse  Av.9  Min: 59  Max: 95    Blood Pressure : 117/66      Respiratory      Respiration: 16, Pulse Oximetry: 97 %        RUL Breath Sounds: Clear, RML Breath Sounds: Clear, RLL Breath Sounds: Diminished, RISA Breath Sounds: Clear, LLL Breath Sounds:  Diminished    Fluids    Intake/Output Summary (Last 24 hours) at 02/17/18 1648  Last data filed at 02/17/18 1200   Gross per 24 hour   Intake                0 ml   Output             2100 ml   Net            -2100 ml       Nutrition  Orders Placed This Encounter   Procedures   • Diet Order     Standing Status:   Standing     Number of Occurrences:   1     Order Specific Question:   Diet:     Answer:   Diabetic [3]     Physical Exam   Constitutional: He is oriented to person, place, and time. He appears well-developed and well-nourished. No distress.   HENT:   Head: Normocephalic.   Mouth/Throat: No oropharyngeal exudate.   Eyes: Conjunctivae are normal. Pupils are equal, round, and reactive to light.   Neck: Normal range of motion. No tracheal deviation present.   Cardiovascular: Normal rate and regular rhythm.    No murmur heard.  Pulmonary/Chest: Effort normal. No respiratory distress. He has no wheezes. He exhibits no tenderness.   Faint crackles at bilateral bases   Abdominal: Soft. He exhibits no distension. There is no tenderness. There is no guarding.   Musculoskeletal: Normal range of motion. He exhibits no edema or tenderness.   Lymphadenopathy:     He has no cervical adenopathy.   Neurological: He is alert and oriented to person, place, and time. No cranial nerve deficit.   Skin: No rash noted. There is erythema.   Large blister on the left lower shin with sloughing of skin, no purulence, surrounding erythema is present   Nursing note and vitals reviewed.      Recent Labs      02/15/18   0309  02/16/18   0224   WBC  8.8  6.3   RBC  4.98  4.53*   HEMOGLOBIN  12.6*  11.7*   HEMATOCRIT  41.2*  37.2*   MCV  82.7  82.1   MCH  25.3*  25.8*   MCHC  30.6*  31.5*   RDW  49.7  49.1   PLATELETCT  144*  142*   MPV  9.8  10.0     Recent Labs      02/15/18   0310  02/16/18   0224  02/17/18   0137   SODIUM  139  136  130*   POTASSIUM  4.3  3.5*  4.6   CHLORIDE  105  106  103   CO2  23  21  22   GLUCOSE  98  88  95   BUN   19  25*  23*   CREATININE  1.35  1.28  1.31   CALCIUM  8.8  8.4*  8.5                      Assessment/Plan     * Cellulitis   Assessment & Plan    Change to keflex.  Wound care  X ray negative for osteo  A1c pending   Blood and wound culture pending        CAD in native artery- (present on admission)   Assessment & Plan    Hx of 2 stents, appears not taking meds for this at home.  Language barrier, Crackles and edema  No active chest pain   Trop negative and EKG no changes   Repeat echo  Add ASA , statin, BB  No indication for Spironolactone or ACE inhibitor based on ejection fraction, we will avoid starting these due to patient's limited capacity to remember taking medications  Lasix 20 mg PO BID        Acute respiratory failure with hypoxia (CMS-[facility])   Assessment & Plan    May be due to elevated pulmonary pressures related to? CUAUHTEMOC.  LE edema, no e/o HF on echo  -Became hypoxic in the low 80s on 1 L, required 2 L to stay in the 90s.  -Recommend outpatient sleep study  -He has no evidence of pneumonia, no wheezing on exam  -Continue Lasix (for peripheral edema)  -Check D-Dimer, consider VQ if elevated (Cr slightly too high for CTA)  -Incentive spirometer        Bilateral lower extremity edema   Assessment & Plan    Mild shortness of breath, bilateral pitting edema, , CXR shows some interstitial edema but echo was normal with no e/o heart failure.  Started on IV lasix 20 mg daily, continue  He has not been compliant due to poor insight and lack of funding  Strict I/O  Fluid and salt restriction  Repeat 2D echo, essentially normal, no significant change.          Hypothyroidism- (present on admission)   Assessment & Plan    Repeat TSH was elevated  Start on levothyroxine 50        Hyponatremia- (present on admission)   Assessment & Plan    Likely due to diuretics.  -Repeat in a.m.        Benign essential HTN- (present on admission)   Assessment & Plan    130s/80s  No meds at home  Follow up.            Quality-Core Measures    35 minutes were spent discussing course and plan with family at bedside, this was in addition to the time of the initial visit with the patient for total time of 65min.  All questions answered.  Greater than 50% of this time was at the patient's bedside.

## 2018-02-19 VITALS
WEIGHT: 179.68 LBS | TEMPERATURE: 97.6 F | DIASTOLIC BLOOD PRESSURE: 58 MMHG | OXYGEN SATURATION: 96 % | BODY MASS INDEX: 30.84 KG/M2 | RESPIRATION RATE: 18 BRPM | HEART RATE: 60 BPM | SYSTOLIC BLOOD PRESSURE: 116 MMHG

## 2018-02-19 LAB
ALBUMIN SERPL BCP-MCNC: 3.6 G/DL (ref 3.2–4.9)
BACTERIA BLD CULT: NORMAL
BACTERIA BLD CULT: NORMAL
BUN SERPL-MCNC: 23 MG/DL (ref 8–22)
CALCIUM SERPL-MCNC: 8.5 MG/DL (ref 8.5–10.5)
CHLORIDE SERPL-SCNC: 102 MMOL/L (ref 96–112)
CO2 SERPL-SCNC: 22 MMOL/L (ref 20–33)
CREAT SERPL-MCNC: 1.23 MG/DL (ref 0.5–1.4)
FERRITIN SERPL-MCNC: 20 NG/ML (ref 22–322)
GLUCOSE SERPL-MCNC: 97 MG/DL (ref 65–99)
IRON SATN MFR SERPL: 10 % (ref 15–55)
IRON SERPL-MCNC: 44 UG/DL (ref 50–180)
PHOSPHATE SERPL-MCNC: 3.5 MG/DL (ref 2.5–4.5)
POTASSIUM SERPL-SCNC: 3.8 MMOL/L (ref 3.6–5.5)
SIGNIFICANT IND 70042: NORMAL
SIGNIFICANT IND 70042: NORMAL
SITE SITE: NORMAL
SITE SITE: NORMAL
SODIUM SERPL-SCNC: 133 MMOL/L (ref 135–145)
SOURCE SOURCE: NORMAL
SOURCE SOURCE: NORMAL
TIBC SERPL-MCNC: 435 UG/DL (ref 250–450)

## 2018-02-19 PROCEDURE — 80069 RENAL FUNCTION PANEL: CPT

## 2018-02-19 PROCEDURE — 700102 HCHG RX REV CODE 250 W/ 637 OVERRIDE(OP): Performed by: INTERNAL MEDICINE

## 2018-02-19 PROCEDURE — 36415 COLL VENOUS BLD VENIPUNCTURE: CPT

## 2018-02-19 PROCEDURE — 83540 ASSAY OF IRON: CPT

## 2018-02-19 PROCEDURE — G8987 SELF CARE CURRENT STATUS: HCPCS | Mod: CJ

## 2018-02-19 PROCEDURE — 97165 OT EVAL LOW COMPLEX 30 MIN: CPT

## 2018-02-19 PROCEDURE — A9270 NON-COVERED ITEM OR SERVICE: HCPCS | Performed by: INTERNAL MEDICINE

## 2018-02-19 PROCEDURE — 700111 HCHG RX REV CODE 636 W/ 250 OVERRIDE (IP): Performed by: INTERNAL MEDICINE

## 2018-02-19 PROCEDURE — 83550 IRON BINDING TEST: CPT

## 2018-02-19 PROCEDURE — 97535 SELF CARE MNGMENT TRAINING: CPT

## 2018-02-19 PROCEDURE — 82728 ASSAY OF FERRITIN: CPT

## 2018-02-19 PROCEDURE — G8988 SELF CARE GOAL STATUS: HCPCS | Mod: CI

## 2018-02-19 PROCEDURE — 99239 HOSP IP/OBS DSCHRG MGMT >30: CPT | Performed by: INTERNAL MEDICINE

## 2018-02-19 RX ORDER — FUROSEMIDE 10 MG/ML
20 INJECTION INTRAMUSCULAR; INTRAVENOUS
Status: DISCONTINUED | OUTPATIENT
Start: 2018-02-20 | End: 2018-02-19 | Stop reason: HOSPADM

## 2018-02-19 RX ORDER — FERROUS SULFATE 325(65) MG
325 TABLET ORAL 2 TIMES DAILY WITH MEALS
Status: DISCONTINUED | OUTPATIENT
Start: 2018-02-19 | End: 2018-02-19 | Stop reason: HOSPADM

## 2018-02-19 RX ORDER — ALBUTEROL SULFATE 90 UG/1
2 AEROSOL, METERED RESPIRATORY (INHALATION) EVERY 6 HOURS PRN
Qty: 8.5 G | Refills: 2 | Status: SHIPPED | OUTPATIENT
Start: 2018-02-19

## 2018-02-19 RX ORDER — CEPHALEXIN 500 MG/1
500 CAPSULE ORAL EVERY 6 HOURS
Qty: 16 CAP | Refills: 0 | Status: SHIPPED | OUTPATIENT
Start: 2018-02-19 | End: 2018-02-23

## 2018-02-19 RX ORDER — FERROUS SULFATE 325(65) MG
325 TABLET ORAL 2 TIMES DAILY WITH MEALS
Qty: 30 TAB | Refills: 2 | Status: SHIPPED | OUTPATIENT
Start: 2018-02-19

## 2018-02-19 RX ORDER — LEVOTHYROXINE SODIUM 0.05 MG/1
50 TABLET ORAL
Qty: 30 TAB | Refills: 2 | Status: SHIPPED | OUTPATIENT
Start: 2018-02-20

## 2018-02-19 RX ORDER — POTASSIUM CHLORIDE 20 MEQ/1
20 TABLET, EXTENDED RELEASE ORAL DAILY
Qty: 60 TAB | Refills: 11 | Status: SHIPPED | OUTPATIENT
Start: 2018-02-20

## 2018-02-19 RX ORDER — CARVEDILOL 3.12 MG/1
3.12 TABLET ORAL 2 TIMES DAILY WITH MEALS
Qty: 60 TAB | Refills: 2 | Status: SHIPPED | OUTPATIENT
Start: 2018-02-19

## 2018-02-19 RX ORDER — TRAZODONE HYDROCHLORIDE 50 MG/1
50 TABLET ORAL
Qty: 30 TAB | Refills: 3 | Status: SHIPPED | OUTPATIENT
Start: 2018-02-19 | End: 2018-02-19

## 2018-02-19 RX ORDER — ATORVASTATIN CALCIUM 40 MG/1
40 TABLET, FILM COATED ORAL
Qty: 30 TAB | Refills: 2 | Status: SHIPPED | OUTPATIENT
Start: 2018-02-19

## 2018-02-19 RX ADMIN — STANDARDIZED SENNA CONCENTRATE AND DOCUSATE SODIUM 2 TABLET: 8.6; 5 TABLET, FILM COATED ORAL at 08:23

## 2018-02-19 RX ADMIN — FUROSEMIDE 20 MG: 10 INJECTION, SOLUTION INTRAMUSCULAR; INTRAVENOUS at 06:12

## 2018-02-19 RX ADMIN — Medication 325 MG: at 17:30

## 2018-02-19 RX ADMIN — CEPHALEXIN 500 MG: 500 CAPSULE ORAL at 06:12

## 2018-02-19 RX ADMIN — ACETAMINOPHEN 650 MG: 325 TABLET, FILM COATED ORAL at 14:23

## 2018-02-19 RX ADMIN — Medication 325 MG: at 11:51

## 2018-02-19 RX ADMIN — CARVEDILOL 3.12 MG: 3.12 TABLET, FILM COATED ORAL at 17:30

## 2018-02-19 RX ADMIN — ASPIRIN 81 MG: 81 TABLET, CHEWABLE ORAL at 08:23

## 2018-02-19 RX ADMIN — CEPHALEXIN 500 MG: 500 CAPSULE ORAL at 17:30

## 2018-02-19 RX ADMIN — CEPHALEXIN 500 MG: 500 CAPSULE ORAL at 11:51

## 2018-02-19 RX ADMIN — HEPARIN SODIUM 5000 UNITS: 5000 INJECTION, SOLUTION INTRAVENOUS; SUBCUTANEOUS at 14:16

## 2018-02-19 RX ADMIN — LEVOTHYROXINE SODIUM 50 MCG: 50 TABLET ORAL at 06:12

## 2018-02-19 RX ADMIN — POTASSIUM CHLORIDE 20 MEQ: 1500 TABLET, EXTENDED RELEASE ORAL at 08:23

## 2018-02-19 RX ADMIN — HEPARIN SODIUM 5000 UNITS: 5000 INJECTION, SOLUTION INTRAVENOUS; SUBCUTANEOUS at 06:12

## 2018-02-19 ASSESSMENT — ACTIVITIES OF DAILY LIVING (ADL): TOILETING: INDEPENDENT

## 2018-02-19 ASSESSMENT — PAIN SCALES - GENERAL
PAINLEVEL_OUTOF10: 4
PAINLEVEL_OUTOF10: 5
PAINLEVEL_OUTOF10: 4

## 2018-02-19 ASSESSMENT — COGNITIVE AND FUNCTIONAL STATUS - GENERAL
HELP NEEDED FOR BATHING: A LITTLE
DRESSING REGULAR LOWER BODY CLOTHING: A LOT
DAILY ACTIVITIY SCORE: 21
SUGGESTED CMS G CODE MODIFIER DAILY ACTIVITY: CJ

## 2018-02-19 NOTE — DISCHARGE SUMMARY
CHIEF COMPLAINT ON ADMISSION  Chief Complaint   Patient presents with   • Wound Check     sent from MD   • Shortness of Breath       CODE STATUS  Full Code    HPI & HOSPITAL COURSE  This is an 82 y.o. Maldivian-speaking male with a history of coronary artery disease, HTN, lower extremity edema admitted 2/14/2018 with worsening swelling and a left leg wound found to have cellulitis.  He was also found to have respiratory failure with hypoxia and pulmonary edema noted on chest x-ray.  He was started on IV diuretics due to concern for congestive heart failure, however echocardiogram was largely normal with a normal ejection fraction of 60%.  He did show improvement with IV diuretics with both his oxygen saturations and lower extremity swelling.    He was started on antibiotics for his lower extremity wound with surrounding cellulitis. He had a wound care consultation, and he will continue to follow with wound care as an outpatient with home health.    He was unable to completely wean from O2.  He was noted to have atelectasis on exam and would desaturate with sleep, therefore there was some suspicion for underlying CUAUHTEMOC.  Despite adequate diuresis, he required 1L with ambulation and was encouraged to use IS on DC.  He was also instructed to f/u with PCP for an outpatient sleep study to further evaluate the etiology of his hypoxia.    He was noted to have mild anemia, iron studies revealed deficiency. He is recommended to have an outpatient screening colonoscopy to continue this workup. He was started on oral iron replacement therapy.    He was transitioned to oral antibiotics which were well tolerated. He was ambulating well with PT and OT and home health was arranged for him to initiate on discharge.    The patient met 2-midnight criteria for an inpatient stay at the time of discharge.    Therefore, he is discharged in good and stable condition with close outpatient follow-up.    SPECIFIC OUTPATIENT FOLLOW-UP  Follow-up  with PCP for outpatient sleep study.    DISCHARGE PROBLEM LIST  Principal Problem:    Cellulitis POA: Unknown  Active Problems:    CAD in native artery POA: Yes    Benign essential HTN POA: Yes    Hyponatremia POA: Yes    Hypothyroidism POA: Yes    Bilateral lower extremity edema POA: Unknown    Acute respiratory failure with hypoxia (CMS-HCC) POA: Unknown    Normocytic anemia POA: Unknown  Resolved Problems:    * No resolved hospital problems. *      FOLLOW UP  No future appointments.  Marta Villarreal M.D.  [id number]  [address]  [facility] [address]  [phone]    Schedule an appointment as soon as possible for a visit in 1 week        MEDICATIONS ON DISCHARGE   Jose Polanco   Home Medication Instructions JANNA:[id number]    Printed on:02/19/18 1428   Medication Information                      albuterol 108 (90 Base) MCG/ACT Aero Soln inhalation aerosol  Inhale 2 Puffs by mouth every 6 hours as needed for Shortness of Breath.             aspirin (ASA) 81 MG CHEW  Take 81 mg by mouth every day.             atorvastatin (LIPITOR) 40 MG Tab  Take 1 Tab by mouth every bedtime.             carvedilol (COREG) 3.125 MG Tab  Take 1 Tab by mouth 2 times a day, with meals.             cephALEXin (KEFLEX) 500 MG Cap  Take 1 Cap by mouth every 6 hours for 4 days.             ferrous sulfate 325 (65 Fe) MG tablet  Take 1 Tab by mouth 2 times a day, with meals.             GARLIC PO  Take 1 Tab by mouth every day.             levothyroxine (SYNTHROID) 50 MCG Tab  Take 1 Tab by mouth Every morning on an empty stomach.             Omega-3 Fatty Acids (FISH OIL PO)  Take 1 Cap by mouth every day.             potassium chloride SA (KDUR) 20 MEQ Tab CR  Take 1 Tab by mouth every day.             traZODone (DESYREL) 50 MG Tab  Take 1 Tab by mouth at bedtime as needed.                 DIET  Orders Placed This Encounter   Procedures   • Diet Order     Standing Status:   Standing     Number of Occurrences:   1     Order Specific Question:    Diet:     Answer:   Diabetic [3]       ACTIVITY  As tolerated.  Weight bearing as tolerated      CONSULTATIONS  None    PROCEDURES  None    LABORATORY  Lab Results   Component Value Date/Time    SODIUM 133 (L) 02/19/2018 02:11 AM    POTASSIUM 3.8 02/19/2018 02:11 AM    CHLORIDE 102 02/19/2018 02:11 AM    CO2 22 02/19/2018 02:11 AM    GLUCOSE 97 02/19/2018 02:11 AM    BUN 23 (H) 02/19/2018 02:11 AM    CREATININE 1.23 02/19/2018 02:11 AM        Lab Results   Component Value Date/Time    WBC 6.3 02/16/2018 02:24 AM    HEMOGLOBIN 11.7 (L) 02/16/2018 02:24 AM    HEMATOCRIT 37.2 (L) 02/16/2018 02:24 AM    PLATELETCT 142 (L) 02/16/2018 02:24 AM        Total time of the discharge process exceeds 40 minutes

## 2018-02-19 NOTE — FACE TO FACE
Face to Face Note  -  Durable Medical Equipment    Aura Navarro D.O. - NPI: [id number]  I certify that this patient is under my care and that they had a durable medical equipment(DME)face to face encounter by myself that meets the physician DME face-to-face encounter requirements with this patient on:    Date of encounter:   Patient:                    MRN:                       YOB: 2018  Jose Polanco  [id number]  1935     The encounter with the patient was in whole, or in part, for the following medical condition, which is the primary reason for durable medical equipment:  COPD    I certify that, based on my findings, the following durable medical equipment is medically necessary:  Oxygen.    HOME O2 Saturation Measurements:(Values must be present for Home Oxygen orders)  Room air sat at rest: 91  Room air sat with amb: 78  With liters of O2: 1, O2 sat at rest with O2: 95  With Liters of O2: 3, O2 sat with amb with O2 : 76  Is the patient mobile?: Yes    My Clinical findings support the need for the above equipment due to:  Hypoxia    Supporting Symptoms: SOB with exertion

## 2018-02-19 NOTE — DISCHARGE PLANNING
Received choice form from ARABELLA Brooks for HH & O2. Referral sent to [facility] at 1443. Referral sent to Jennifer  at 1446.

## 2018-02-19 NOTE — THERAPY
"Physical Therapy Evaluation completed.   Bed Mobility:  Supine to Sit: Supervised  Transfers: Sit to Stand: Stand by Assist  Gait: Level Of Assist: Supervised (w/fww.) with Front-Wheel Walker       Plan of Care: Patient with no further skilled PT needs in the acute care setting at this time  Discharge Recommendations: Equipment: Front-Wheel Walker. Post-acute therapy Discharge to home with home health for additional skilled therapy services.    See \"Rehab Therapy-Acute\" Patient Summary Report for complete documentation.   Mr. Polanco's baseline balance is frequent falls according to his daughter. He demonstrates decreased balance without walker but that is improved to fair+ with use of walker; he demonstrated no carloz loss of balance with minimal manual perturbations. He is not likely to benefit from skilled acute physical therapy but may benefit from home health after discharging home.   "

## 2018-02-19 NOTE — CARE PLAN
Problem: Safety  Goal: Will remain free from falls  Outcome: PROGRESSING SLOWER THAN EXPECTED  Patient occasionally forgets to call before getting up. No falls this hospital stay    Problem: Respiratory:  Goal: Respiratory status will improve  Outcome: PROGRESSING AS EXPECTED  Patient O2 per NC 1L now

## 2018-02-19 NOTE — DISCHARGE PLANNING
Medical Social Worker    Esmer met with pt and pt's daughter and spouse at bedside.  Pt's family interpreted for pt.  Pt's address:  [address] [place] [address], [place] [address]    Plan:  ESMER will continue to assist with pt's d/c needs.

## 2018-02-19 NOTE — DISCHARGE PLANNING
"Medical Social Worker    ARABELLA and unit clerk met with pt at bedside. Unit clerk was an  for this meeting.  Pt reports concerns financially for medications.  SW reported he has two insurance and will send his prescriptions through his insurance.  Pt reports no other questions at this time.     Pt's daughter, Breana, [phone], reports pt lives by himself. Pt's address on facesheet is the daughters. Pt's daughter reports he lives by himself and shares she does not know his address.  Pt's daughter reports she will assist with transportation, once d/c and asked SW to leave message for her other sister, Sloane, [phone].  SW left message at this number.     Pt reports he would like to send a blanket referral for Hh.  ARABELLA communicated with pt's daughter, Breana to discuss his O2 order because ARABELLA viewed this order after meeting with pt.  Pt's daughter reports to send to \"which company will work with him,\" and his insurance.  In chart review, pt was previously working with [facility].  ARABELLA faxed over O2 choice form as well as HH choice to [facility].       Plan:  ARABELLA will continue to assist with pt's d/c needs.        "

## 2018-02-19 NOTE — CARE PLAN
Problem: Communication  Goal: The ability to communicate needs accurately and effectively will improve  Outcome: PROGRESSING AS EXPECTED  POC discussed at bedside - patient verbalizes understanding.  Education on medications and procedures provided.   White board updated, patient encouraged to call for all needs. Calls appropriately. No immediate concerns at this time.       Problem: Safety  Goal: Will remain free from falls  Outcome: PROGRESSING AS EXPECTED  tredded socks on patient, call light in reach, personal possessions in available/in reach, hourly rounding in place. Education regarding bed alarm provided. Patient verbalizes understanding.  Patient calls to stand EOB to use urinal.

## 2018-02-19 NOTE — DISCHARGE INSTRUCTIONS
Discharge Instructions    Discharged to home by car with relative. Discharged via wheelchair, hospital escort: Yes.  Special equipment needed: Oxygen and Walker    Be sure to schedule a follow-up appointment with your primary care doctor or any specialists as instructed.     Discharge Plan:   Diet Plan: Discussed  Activity Level: Discussed  Confirmed Follow up Appointment: Appointment Scheduled  Medication Reconciliation Updated: Yes  Pneumococcal Vaccine Given - only chart on this line when given: Given (See MAR)  Influenza Vaccine Indication: Indicated: 65 years and older  Influenza Vaccine Given - only chart on this line when given: Influenza Vaccine Given (See MAR)    I understand that a diet low in cholesterol, fat, and sodium is recommended for good health. Unless I have been given specific instructions below for another diet, I accept this instruction as my diet prescription.   Other diet: Diabetic     Special Instructions: None    · Is patient discharged on Warfarin / Coumadin?   No     Depression / Suicide Risk    As you are discharged from this [facility] facility, it is important to learn how to keep safe from harming yourself.    Recognize the warning signs:  · Abrupt changes in personality, positive or negative- including increase in energy   · Giving away possessions  · Change in eating patterns- significant weight changes-  positive or negative  · Change in sleeping patterns- unable to sleep or sleeping all the time   · Unwillingness or inability to communicate  · Depression  · Unusual sadness, discouragement and loneliness  · Talk of wanting to die  · Neglect of personal appearance   · Rebelliousness- reckless behavior  · Withdrawal from people/activities they love  · Confusion- inability to concentrate     If you or a loved one observes any of these behaviors or has concerns about self-harm, here's what you can do:  · Talk about it- your feelings and reasons for harming yourself  · Remove any  means that you might use to hurt yourself (examples: pills, rope, extension cords, firearm)  · Get professional help from the community (Mental Health, Substance Abuse, psychological counseling)  · Do not be alone:Call your Safe Contact- someone whom you trust who will be there for you.  · Call your local CRISIS HOTLINE 153-2871 or 735-149-0559  · Call your local Children's Mobile Crisis Response Team [place] (253) 202-9481 or wwwAdmitly  · Call the toll free National Suicide Prevention Hotlines   · National Suicide Prevention Lifeline 336-914-OULG (4888)  · National [employer] Line Network 800-SUICIDE (688-3236)    Celulitis  (Cellulitis)   La celulitis es chasidy infección de la piel y del tejido que se encuentra debajo de la piel. El área infectada generalmente está de color drake y duele. Ocurre con más frecuencia en los brazos y en las piernas.  CUIDADOS EN EL HOGAR   · [place] los antibióticos gonzalez lucinad se le indicó. Finalice el medicamento, aunque comience a sentirse mejor.  · Mantenga el brazo o la pierna infectada elevada.  · Aplique paños calientes en la lucila hasta 4 veces al día.  · [place] sólo los medicamentos que le haya indicado el médico.  · Cumpla con los controles médicos según las indicaciones.  SOLICITE AYUDA SI:  · Observa chasidy línea josefa en la piel que sale desde la herida.  · El área josefa se extiende o se vuelve de color oscuro.  · El hueso o la articulación que se encuentran por debajo de la lucila infectada le duelen después de que la piel se kadi.  · La infección se repite en la misma lucila o en chasidy lucila diferente.  · Tiene un bulto inflamado (hinchado) en la lucila infectada.  · Aparecen nuevos síntomas.  · Tiene fiebre.  SOLICITE AYUDA DE INMEDIATO SI:   · Se siente muy somnoliento.  · [place]om[place] o tiene diarrea.  · Se siente enfermo y tiene rogelio musculares.  ASEGÚRESE DE QUE:   · Comprende estas instrucciones.  · Controlará serrano enfermedad.  · Solicitará ayuda de inmediato si no mejora o si  empeora.     Esta información no tiene lucinda fin reemplazar el consejo del médico. Asegúrese de hacerle al médico cualquier pregunta que tenga.     Document Released: 06/07/2011 Document Revised: 05/03/2016  [place] Interactive Patient Education ©2016 Elsevier Inc.      Edema  (Edema)  Un edema es inna acumulación anormal de líquidos. Es más frecuente en las piernas y los muslos. La hinchazón indolora de pies y tobillos es más probable a medida que inna persona envejece. También es común en la piel más floja, lucinda alrededor de los ojos.  CUIDADOS EN EL HOGAR   · Mantenga la parte afectada del cuerpo por encima del nivel del corazón mientras está recostado.  · No se quede quieto ni permanezca de pie quintin mucho tiempo.  · No coloque nada exactamente debajo de las rodillas al recostarse.  · No use ropa ajustada en los muslos.  · Ejercite las piernas para ayudar a que la inflamación (hinchazón) disminuya.  · Use vendajes elásticos o medias de compresión según las indicaciones del médico.  · Inna dieta con bajo contenido de sal puede ayudar a disminuir la hinchazón.  · Solo tome los medicamentos que le haya indicado serrano médico.  SOLICITE AYUDA SI:  · El tratamiento no funciona.  · Tiene inna enfermedad cardíaca, hepática o renal, y nota que la piel parece hinchada o tiene aspecto brillante.  · Tiene hinchazón en las piernas que no mejora cuando las eleva.  · [place] aumentado súbitamente de peso sin ningún motivo.  SOLICITE AYUDA DE INMEDIATO SI:   · Tiene dificultad para respirar o le duele el pecho.  · No puede respirar cuando se acuesta.  · Las áreas hinchadas presentan dolor, enrojecimiento o calor.  · Tiene inna enfermedad cardíaca, hepática o renal, y le aparece un edema de repente.  · Tiene fiebre y los síntomas empeoran de manera súbita.  ASEGÚRESE DE QUE:   · Comprende estas instrucciones.  · Controlará serrano afección.  · Recibirá ayuda de inmediato si no mejora o si empeora.     Esta información no tiene lucinda fin  reemplazar el consejo del médico. Asegúrese de hacerle al médico cualquier pregunta que tenga.     Document Released: 10/08/2014 Document Revised: 12/23/2014  Elsevier Interactive Patient Education ©2016 [employer]    Albuterol inhalation aerosol  ¿Qué es rodrigue medicamento?  El ALBUTEROL es un broncodilatador. Ayuda a abrir las vías aéreas a los pulmones y le permite respirar más fácilmente. Rodrigue medicamento es utilizado para tratar y prevenir el broncoespasmo.  Rodrigue medicamento puede ser utilizado para otros usos; si tiene alguna pregunta consulte con serrano proveedor de atención médica o con serrano farmacéutico.  MARCAS COMERCIALES DISPONIBLES: Proair HFA, Proventil HFA, Proventil, Respirol , Ventolin HFA, Ventolin  ¿Qué le aneta informar a mi profesional de la juan antes de hernandez rodrigue medicamento?  Necesita saber si usted presenta alguno de los siguientes problemas o situaciones:  -diabetes  -enfermedad cardiaca o pulso cardiaco irregular  -tonia presión sanguínea  -feocromocitoma  -convulsiones  -enfermedad tiroidea  -chasidy reacción alérgica o inusual al albuterol, al levalbuterol, a los sulfitos, a otros medicamentos, alimentos, colorantes o conservadores  -si está embarazada o buscando quedar embarazada  -si está amamantando a un bebé  ¿Cómo aneta utilizar rodrigue medicamento?  Rodrigue medicamento es para inhalación por vía oral. Siga las instrucciones de la etiqueta del medicamento. Utilícelo a intervalos regulares. No utilice serrano medicamento con chasidy frecuencia mayor a la indicada. Asegúrese de que esté utilizando serrano inhalador correctamente. Si tiene algunas preguntas, comuníquese con serrano médico o serrano proveedor de atención médica.  Utilice rodrigue medicamento antes de usar cualquier otro inhalador. Deje pasar 5 minutos o más antes de utilizar otro inhalador.  Hable con serrano pediatra para informarse acerca del uso de rodrigue medicamento en niños. Puede requerir atención especial.  Sobredosis: Póngase en contacto inmediatamente con un  centro toxicológico o chasidy caren de urgencia si usted anastacia que haya tomado demasiado medicamento.  ATENCIÓN: Rodrigue medicamento es solo para usted. No comparta rodrigue medicamento con nadie.  ¿Qué sucede si me olvido de chasidy dosis?  Si olvida chasidy dosis, úsela lo antes posible. Si es angel la hora de serrano dosis siguiente, aplique sólo marcus dosis. No use dosis dobles o adicionales.  ¿Qué puede interactuar con rodrigue medicamento?  -antiinfecciosos lucinda cloroquina y pentamidina  -cafeína  -cisapride  -diuréticos  -medicamentos para resfríos  -medicamentos para tratar la depresión o trastornos psicóticos o emocionales  -medicamentos para bajar de peso incluyendo algunos productos a base de hierbas  -metadona  -ciertos antibióticos lucinda la claritromicina, eritromicina, levofloxacino y linezolid  -ciertos medicamentos cardiacos  -hormonas esteroideas, tales lucinda dexametasona, cortisona, hidrocortisona  -teofilina  -hormonas tiroideas  Puede ser que esta lista no menciona todas las posibles interacciones. Informe a serrano profesional de la juan de todos los productos a base de hierbas, medicamentos de venta derick o suplementos nutritivos que esté tomando. Si usted fuma, consume bebidas alcohólicas o si utiliza drogas ilegales, indíqueselo también a serrano profesional de la juan. Algunas sustancias pueden interactuar con serrano medicamento.  ¿A qué aneta estar atento al usar rodrigue medicamento?  Informe a serrano médico o a serrano profesional de la juan si clover síntomas no mejoran. No utilice albuterol adicional. Si serrano asma o bronquitis empeora mientras está recibiendo rodrigue medicamento, comuníquese inmediatamente con serrano médico.  Si el medicamento le seca la boca trate de masticar chicle sin azúcar o chupar caramelos duros. Hannah agua lucinda le haya indicado.  ¿Qué efectos secundarios puedo tener al utilizar rodrigue medicamento?  Efectos secundarios que debe informar a serrano médico o a serrano profesional de la juan tan pronto lucinda sea posible:  -reacciones alérgicas lucinda  erupción cutánea, picazón o urticarias, hinchazón de la erji, labios o lengua  -problemas respiratorios  -dolor en el pecho  -sensación de desmayos o mareos, caídas  -tonia presión sanguínea  -pulso cardiaco irregular  -fiebre  -calambres o debilidad muscular  -dolor, hormigueo, entumecimiento de las marisa o pies  -vómito  Efectos secundarios que, por lo general, no requieren atención médica (debe informarlos a serrano médico o a serrano profesional de la juan si persisten o si son molestos):  -tos  -dificultad para conciliar el sueño  -dolor de radha  -nerviosismo, temblores  -malestar estomacal  -congestión nasal o goteo de la nariz  -irrítación de garganta  -sabor inusual  Puede ser que esta lista no menciona todos los posibles efectos secundarios. Comuníquese a serrano médico por asesoramiento médico sobre los efectos secundarios. Usted puede informar los efectos secundarios a la FDA por teléfono al 1-239-FDA-3593.  ¿Dónde aneta guardar mi medicina?  [place] fuera del alcance de los niños.  Guarde a temperatura ambiente entre 15 y 30 grados C (59 y 86 grados F). El contenido se encuentra bajo presión y puede explotar si lo expone al calor o al adriane. No lo congele. El frío disminuye la efectividad del medicamento. Deseche todo el medicamento que no haya utilizado, después de la fecha de vencimiento. Debe desechar los inhaladores después de que se paul usado la cantidad de bocanadas indicado en la etiqueta o por la fecha de vencimiento; el que llegue rubi. Debe desechar Ventolin HFA después de 12 meses de sacar de la bolsa de aluminio. Revise las instrucciones que vienen con serrano medicamento.  ATENCIÓN: [place] folleto es un resumen. Puede ser que no cubra toda la posible información. Si usted tiene preguntas acerca de esta medicina, consulte con serrano médico, serrano farmacéutico o serrano profesional de la juan.  © 2014, Elsevier/Gold Standard. (8/28/2012 5:50:35 PM)    Atorvastatin tablets  ¿Qué es rodrigue medicamento?  La ATORVASTATINA es  conocido lucinda un inhibidor de la HMG-CoA reductasa o 'estatina'. Reduce el nivel de colesterol y triglicéridos en la sandor. Rodrigue medicamento también puede reducir el riesgo de padecer ataques cardiacos, derrame cerebral u otros problemas de la juan en pacientes que corren el riesgo de padecer inna enfermedad cardiaca. Inna dieta y cambios a serrano estilo de get son a menudo utilizados con rodrigue medicamento.  Rodrigue medicamento puede ser utilizado para otros usos; si tiene alguna pregunta consulte con serrano proveedor de atención médica o con serrano farmacéutico.  MARCAS COMERCIALES DISPONIBLES: Lipitor  ¿Qué le aneta informar a mi profesional de la juan antes de hernandez rodrigue medicamento?  Necesita saber si usted presenta alguno de los siguientes problemas o situaciones:  -consume bebidas alcohólicas con frecuencia  -antecedentes de derrame cerebral, TIA  -enfermedad renal  -enfermedad hepática  -rogelio o debilidades musculares  -otra condición médica  -inna reacción alérgica o inusual a la atorvastatina, a otros medicamentos, alimentos, colorantes o conservadores  -si está embarazada o buscando quedar embarazada  -si está amamantando a un bebé  ¿Cómo aneta utilizar rodrigue medicamento?  [place] rodrigue medicamento por vía oral con un vaso de agua. Siga las instrucciones de la etiqueta del medicamento. Puede hernandez rodrigue medicamento con o sin alimentos. [place] clover dosis a intervalos regulares. No tome serrano medicamento con inna frecuencia mayor a la indicada.  Hable con serrano pediatra para informarse acerca del uso de rodrigue medicamento en niños. Aunque rodrigue medicamento ha sido recetado a niños tan menores lucinda de 10 años de edad para condiciones selectivas, las precauciones se aplican.  Sobredosis: Póngase en contacto inmediatamente con un centro toxicológico o inna caren de urgencia si usted anastacia que haya tomado demasiado medicamento.  ATENCIÓN: Rodrigue medicamento es solo para usted. No comparta rodrigue medicamento con nadie.  ¿Qué sucede si me olvido de inna  dosis?  Si olvida chasidy dosis, tómela lo antes posible. Si es angel la hora de serrano dosis siguiente, tome sólo marcus dosis. No tome dosis adicionales o dobles.  ¿Qué puede interactuar con rodrigue medicamento?  No tome esta medicina con ninguno de los siguientes medicamentos:  -levadura josefa de arroz  -telaprevir  -telitromicina  -voriconazol  Esta medicina también puede interactuar con los siguientes medicamentos:  -alcohol  -medicamentos antivirales para el VIH o SIDA  -boceprevir  -ciertos antibióticos, tales lucinda la claritromicina, eritromicina, troleandomicina  -ciertos medicamentos para el colesterol, tales lucinda fenofibrato o gemfibrozil  -cimetidina  -claritromicina  -colchicina  -ciclosporina  -digoxina  -hormonas femeninas, lucinda estrógenos, progestinas o píldoras anticonceptivas  -jugo de toronja  -medicamentos para las infecciones micóticas, tales lucinda fluconazol, itraconazol, quetoconazol  -niacina  -rifampicina  -espironolactona  Puede ser que esta lista no menciona todas las posibles interacciones. Informe a serrano profesional de la juan de todos los productos a base de hierbas, medicamentos de venta derick o suplementos nutritivos que esté tomando. Si usted fuma, consume bebidas alcohólicas o si utiliza drogas ilegales, indíqueselo también a serrano profesional de la juan. Algunas sustancias pueden interactuar con serrano medicamento.  ¿A qué aneta estar atento al usar rodrigue medicamento?  Visite a serrano médico o a serrano profesional de la juan para chequeos periódicos. Puede necesitar exámenes regulares para asegurarse de que el hígado esté funcionando obdulia.  Informe a serrano médico o a serrano profesional de la juan tan pronto lucinda pueda si tiene debilidad, sensibilidad o rogelio musculares que no tienen explicación, especialmente si también tiene fiebre y cansancio. Serrano médico o profesional de la juan puede informarle que deje de hernandez rodrigue medicamento si desarrolla problemas musculares. Si clover problemas musculares no desaparecen después  de parar rodrigue medicamento, comuníquese con serrano profesional de la juan.  Rodrigue medicamento es sólo parte de un programa integral para la juan de [facility]. Serrano médico o dietista pueden sugerirle chasidy dieta con bajo contenido de colesterol y de grasa para ayudarle. Evite el alcohol y fumar, y mantenga un programa adecuado de ejercicios físicos.  No utilice rodrigue medicamento si está embarazada o amamantando. Existe la posibilidad de efectos secundarios graves a un bebé sin nacer o a un lactante. Para más información hable con serrano farmacéutico o serrano médico.  Rodrigue medicamento puede afectar serrano nivel de azúcar en la sandor. Si tiene diabetes, consulte a serrano médico o a serrano profesional de la juan antes de cambiar serrano dieta o la dosis de serrano medicamento para la diabetes.  Si va a someterse a chasidy operación, informe a serrano profesional de la juan que está tomando rodrigue medicamento.  ¿Qué efectos secundarios puedo tener al utilizar rodrigue medicamento?  Efectos secundarios que debe informar a serrano médico o a serrano profesional de la juan tan pronto lucinda sea posible:  -reacciones alérgicas lucinda erupción cutánea, picazón o urticarias, hinchazón de la reji, labios o lengua  -orina de color amarillo oscuro  -fiebre  -rogelio articulares  -calambres, rogelio musculares  -enrojecimiento, formación de ampollas, descamación o distensión de la piel, inclusive dentro de la boca  -dificultad para orinar o cambios en el volumen de orina  -cansancio o debilidad inusual  -color amarillento de ojos o piel  Efectos secundarios que, por lo general, no requieren atención médica (debe informarlos a serrano médico o a serrano profesional de la juan si persisten o si son molestos):  -estreñimiento  -acidez de estómago  -gas, dolor, malestar estomacal  Puede ser que esta lista no menciona todos los posibles efectos secundarios. Comuníquese a serrano médico por asesoramiento médico sobre los efectos secundarios. Usted puede informar los efectos secundarios a la FDA por teléfono al  1-800-FDA-1088.  ¿Dónde aneta guardar mi medicina?  [place] fuera del alcance de los niños.  Guárdela a chasidy temperatura ambiente entre 20 y 25 grados C (68 y 77 grados F). Deseche todo el medicamento que no haya utilizado, después de la fecha de vencimiento.  ATENCIÓN: [place] folleto es un resumen. Puede ser que no cubra toda la posible información. Si usted tiene preguntas acerca de esta medicina, consulte con serrano médico, serrano farmacéutico o serrano profesional de la juan.  © 2014, Elsevier/[employer] Standard. (11/9/2012 5:43:43 PM)    Carvedilol tablets  ¿Qué es rodrigue medicamento?  El CARVEDILOL es un beta-bloqueante. Los beta-bloqueantes reducen la carga de trabajo del corazón y lo ayudan a latir más regularmente. Rodrigue medicamento se usa para tratar la tonia presión sanguínea y la insuficiencia cardiaca.  Rodrigue medicamento puede ser utilizado para otros usos; si tiene alguna pregunta consulte con serrano proveedor de atención médica o con serrano farmacéutico.  MARCAS COMERCIALES DISPONIBLES: Coreg  ¿Qué le aneta informar a mi profesional de la juan antes de hernandez rodrigue medicamento?  Necesita saber si usted presenta alguno de los siguientes problemas o situaciones:  -problemas de circulación  -diabetes  -antecedentes de ataques o enfermedades cardíacas  -enfermedad hepática  -enfermedad pulmonar o respiratoria, tales lucinda enfisema o asma  -feocromocitoma  -pulso cardiaco lento o irregular  -enfermedad tiroidea  -chasidy reacción alérgica o inusual al carvedilol, otros beta-bloqueantes, medicamentos, alimentos, colorantes o conservantes  -si está embarazada o buscando quedar embarazada  -si está amamantando a un bebé  ¿Cómo aneta utilizar rodrigue medicamento?  [place] rodrigue medicamento por vía oral con un vaso de agua. Siga las instrucciones de la etiqueta del medicamento. Es preferible hernandez las tabletas con alimentos. Kevin clover dosis a intervalos regulares. No tome serrano medicamento con chasidy frecuencia mayor que la indicada. No deje de tomarlo excepto si  así lo indica serrano médico o serrano profesional de la juan.  Hable con serrano pediatra para informarse acerca del uso de rodrigue medicamento en niños. Puede requerir atención especial.  Sobredosis: Póngase en contacto inmediatamente con un centro toxicológico o chasidy caren de urgencia si usted anastacia que haya tomado demasiado medicamento.  ATENCIÓN: Rodrigue medicamento es solo para usted. No comparta rodrigue medicamento con nadie.  ¿Qué sucede si me olvido de chasidy dosis?  Si olvida chasidy dosis, tómela lo antes posible. Si es angel la hora de la próxima dosis, tome sólo marcus dosis. No tome dosis adicionales o dobles.  ¿Qué puede interactuar con rodrigue medicamento?  No tome esta medicina con ninguno de los siguientes medicamentos:  -sotalol  Esta medicina también puede interactuar con los siguientes medicamentos:  -cimetidina  -clonidina  -ciclosporina  -digoxina  -IMAOs, tales lucinda Carbex, Eldepryl, Marplan, Nardil y Parnate  -medicamentos para la presión sanguínea, enfermedad cardiaca, pulso cardiaco irregular  -medicamentos para la depresión, tales lucinda fluoxetina y paroxetina  -medicamentos para la diabetes  -medicamentos para controlar el ritmo cardiaco, tales lucinda propafenona y quinidina  -reserpina  -rifampicina  Puede ser que esta lista no menciona todas las posibles interacciones. Informe a serrano profesional de la juan de todos los productos a base de hierbas, medicamentos de venta derick o suplementos nutritivos que esté tomando. Si usted fuma, consume bebidas alcohólicas o si utiliza drogas ilegales, indíqueselo también a serrano profesional de la juan. Algunas sustancias pueden interactuar con serrano medicamento.  ¿A qué aneta estar atento al usar rodrigue medicamento?  Controle serrano presión sanguínea y serrano frecuencia cardiaca regularmente mientras esté tomando rodrigue medicamento. Pregunte a serrano médico o a serrano profesional de la juan cuáles deben ser serrano frecuencia cardiaca y serrano presión sanguínea y cuándo deberá comunicarse con él/trish. No suspenda rodrigue  medicamento de manera abrupta. Puede causarle serios problemas cardiacos.  Comuníquese con serrano médico o con serrano profesional de la juan si tiene dificultad para respirar mientras esté tomando rodrigue medicamento.  Controle serrano peso todos los días. Consulte con serrano médico o con serrano profesional de la juan acerca de cuándo debe informarle sobre los aumentos de peso.  Puede experimentar somnolencia o mareos. No conduzca ni utilice maquinaria ni roya nada que le exija permanecer en estado de alerta hasta que sepa cómo le afecta rodrigue medicamento. Para reducir el riesgo de mareos o desmayos, no se siente ni se ponga de pie con rapidez. El alcohol puede provocar somnolencia e incrementar el enrojecimiento y el pulso cardiaco. Evite consumir bebidas alcohólicas.  Si va a someterse a chasidy operación, informe a serrano médico o a serrano profesional de la juan que está tomando rodrigue medicamento.  ¿Qué efectos secundarios puedo tener al utilizar rodrigue medicamento?  Efectos secundarios que debe informar a serrano médico o a serrano profesional de la juan tan pronto lucinda sea posible:  -reacciones alérgicas lucinda erupción cutánea, picazón o urticarias, hinchazón de la reji, labios o lengua  -problemas respiratorios  -orina oscura  -latidos cardiacos irregulares  -piernas o tobillos hinchados  -vómito  -color amarillento de los ojos o la piel  Efectos secundarios que, por lo general, no requieren atención médica (debe informarlos a serrano médico o a serrano profesional de la juan si persisten o si son molestos):  -cambios del deseo sexual o función sexual  -diarrea  -ojos secos (especialmente si usa lentes de contacto)  -piel seca con picazón  -dolor de radha  -náuseas  -cansancio inusual  Puede ser que esta lista no menciona todos los posibles efectos secundarios. Comuníquese a serrano médico por asesoramiento médico sobre los efectos secundarios. Savita puede informar los efectos secundarios a la FDA por teléfono al 1-800-FDA-3333.  ¿Dónde aneta guardar mi  medicina?  Manténgala fuera del alcance de los niños.  Guárdela a temperatura ambiente inferior a 30 grados C (86 grados F). Protéjala de la humedad. Mantenga el envase obdulia cerrado. Deseche todo medicamento que no haya utilizado, después de serrano fecha de vencimiento.  ATENCIÓN: Rodrigue folleto es un resumen. Puede ser que no cubra toda la posible información. Si usted tiene preguntas acerca de esta medicina, consulte con serrano médico, serrano farmacéutico o serrano profesional de la juan.  © 2014, Elsevier/Gold Standard. (10/20/2008 5:06:00 PM)    Cephalexin tablets or capsules  ¿Qué es rodrigue medicamento?  La CEFALEXINA es un antibiótico cefalosporínico. Se utiliza en el tratamiento de ciertos tipos de infecciones bacterianas. No es efectivo para resfríos, gripe u otras infecciones de origen viral.  Rodrigue medicamento puede ser utilizado para otros usos; si tiene alguna pregunta consulte con serrano proveedor de atención médica o con serrano farmacéutico.  MARCAS COMERCIALES DISPONIBLES: Biocef, Keflex, Keftab  ¿Qué le aneta informar a mi profesional de la juan antes de hernandez rodrigue medicamento?  Necesita saber si usted presenta alguno de los siguientes problemas o situaciones:  -enfermedad renal  -problemas estomacales o intestinales, especialmente colitis  -chasidy reacción alérgica o inusual a la cefalexina, otros cefalosporínicos, penicilinas, otros antibióticos, medicamentos, alimentos, colorantes o conservadores  -si está embarazada o buscando quedar embarazada  -si está amamantando a un bebé  ¿Cómo aneta utilizar rodrigue medicamento?  [place] rodrigue medicamento por vía oral con un vaso lleno de agua. Siga las instrucciones de la etiqueta del medicamento. Rodrigue medicamento se puede hernandez con o sin alimentos. [place] clover dosis a intervalos regulares. No tome serrano medicamento con chasidy frecuencia mayor que la indicada. Complete todo el tratamiento con el medicamento según lo haya recetado serrano médico o serrano profesional de la juan aun si considera que serrano problema ha  esther.  Hable con serrano pediatra para informarse acerca del uso de rodrigue medicamento en niños. Puede requerir atención especial.  Sobredosis: Póngase en contacto inmediatamente con un centro toxicológico o chasidy caren de urgencia si usted anastacia que haya tomado demasiado medicamento.  ATENCIÓN: Rodrigue medicamento es solo para usted. No comparta rodrigue medicamento con nadie.  ¿Qué sucede si me olvido de chasidy dosis?  Si olvida chasidy dosis, tómela lo antes posible. Si es angel la hora de la próxima dosis, tome sólo marcus dosis. No tome dosis adicionales o dobles. Debe transcurrir un período de por lo menos 4 a 6 horas entre chasidy dosis y la siguiente.  ¿Qué puede interactuar con rodrigue medicamento?  -probenecid  -otros antibióticos  Puede ser que esta lista no menciona todas las posibles interacciones. Informe a serrano profesional de la juan de todos los productos a base de hierbas, medicamentos de venta derick o suplementos nutritivos que esté tomando. Si usted fuma, consume bebidas alcohólicas o si utiliza drogas ilegales, indíqueselo también a serrano profesional de la juan. Algunas sustancias pueden interactuar con serrano medicamento.  ¿A qué aneta estar atento al usar rodrigue medicamento?  Si los síntomas no comienzan a mejorar a los pocos días, informe a serrano médico o a serrano profesional de la juan.  No trate la diarrea con productos de venta derick. Comuníquese con serrano médico si tiene diarrea que dura más de 2 días o si es severa y acuosa.  Si es diabético podrá obtener un resultado positivo falso en los análisis de determinación del nivel de azúcar en la orina. Consulte con serrano médico o con serrano profesional de la juan.  ¿Qué efectos secundarios puedo tener al utilizar rodrigue medicamento?  Efectos secundarios que debe informar a serrano médico o a serrano profesional de la juan tan pronto lucinda sea posible:  -reacciones alérgicas lucinda erupción cutánea, picazón o urticarias, hinchazón de la reji, labios o lengua  -problemas respiratorios  -dolor o dificultad para  orinar  -enrojecimiento, formación de ampollas, descamación o aflojamiento de la piel, inclusive dentro de la boca  -diarrea severa o acuosa  -cansancio o debilidad inusual  -color amarillento de los ojos o la piel  Efectos secundarios que, por lo general, no requieren atención médica (debe informarlos a serrano médico o a serrano profesional de la juan si persisten o si son molestos):  -gas o acidez de estómago  -irritación genital o anal  -dolor de radha  -rogelio musculares y articulares  -náuseas, vómitos  Puede ser que esta lista no menciona todos los posibles efectos secundarios. Comuníquese a serrano médico por asesoramiento médico sobre los efectos secundarios. Usted puede informar los efectos secundarios a la FDA por teléfono al 1-[facility]-1502.  ¿Dónde aneta guardar mi medicina?  [place]ant[place]ngala fuera del alcance de los niños.  Guárdela a temperatura ambiente, entre 15 y 30 grados C (59 y 86 grados F). Deseche todo el medicamento que no haya utilizado, después de la fecha de vencimiento.  ATENCIÓN: Rodrigue folleto es un resumen. Puede ser que no cubra toda la posible información. Si usted tiene preguntas acerca de esta medicina, consulte con serrano médico, serrano farmacéutico o serrano profesional de la juan.  © 2014, Elsevier/Gold Standard. (12/16/2008 3:33:00 PM)    Levothyroxine tablets  ¿Qué es rodrigue medicamento?  La LEVOTIROXINA es chasidy hormona tiroidea. Rodrigue medicamento puede mejorar los síntomas de deficiencia tiroidea, tales lucinda hablar con lentitud, falta de energía, aumento de peso, caída del yazan, piel seca y sensibilidad inusual al frío. También sirve para tratar chasidy enfermedad llamada bocio (la dilatación de la glándula tiroides). Se utiliza también para tratar algunos tipos de cáncer tiroideo junto con la cirugía y otros medicamentos.  Rodrigue medicamento puede ser utilizado para otros usos; si tiene alguna pregunta consulte con serrano proveedor de atención médica o con serrano farmacéutico.  MARCAS COMERCIALES DISPONIBLES: Andria ,  Levo-T, Levothroid, Levoxyl, Synthroid, Thyro-Tabs, Unithroid  ¿Qué le aneta informar a mi profesional de la juan antes de hernandez rodrigue medicamento?  Necesita saber si usted presenta alguno de los siguientes problemas o situaciones:  -angina  -problemas de coagulación   -diabetes  -si está haciendo dieta o participa en un programa para bajar de peso  -problemas de fertilidad  -enfermedad cardiaca  -altos niveles de la hormona tiroidea  -problema de la glándula pituitaria  -ataque cardíaco previo  -chasidy reacción alérgica o inusual a la levotiroxina, otras hormonas tiroideas, a otros medicamentos, alimentos, colorantes o conservantes  -si está embarazada o buscando quedar embarazada  -si está amamantando a un bebé  ¿Cómo aneta utilizar rodrigue medicamento?  [place] rodrigue medicamento por vía oral con agua abundante. Es mejor hernandez rodrigue medicamento con el estómago vacío por lo menos 30 minutos antes o 2 horas después de hcasidy comida. Siga las instrucciones de la etiqueta del medicamento. Tómelo a la misma hora todos los días. No tome serrano medicamento con chasidy frecuencia mayor que la indicada.  Hable con serrano pediatra para informarse acerca del uso de rodrigue medicamento en niños. Aunque rodrigue medicamento ha sido recetado a niños y bebés tan menores lucinda unos pocos días de edad para condiciones selectivas, las precauciones se aplican. Para los bebés, puede triturar la tableta y mezclarla con chasidy pequeña cantidad (5-10 ml o 1 a 2 cucharaditas) de agua, leche materna o formula del lactante sin soya. No lo mezcle con formula del lactante a base de soya.  Sobredosis: Póngase en contacto inmediatamente con un centro toxicológico o chasidy caren de urgencia si usted anastacia que haya tomado demasiado medicamento.  ATENCIÓN: Rodrigue medicamento es solo para usted. No comparta rodrigue medicamento con nadie.  ¿Qué sucede si me olvido de chasidy dosis?  Si olvida chasidy dosis, tómela lo antes posible. Si es angel la hora de la próxima dosis, tome sólo marcus dosis. No tome  dosis adicionales o dobles.  ¿Qué puede interactuar con rodrigue medicamento?  -amiodarona  -antiácidos  -medicamentos antitiroideos  -suplementos de calcio  -carbamazepina  -colestiramina  -colestipol  -digoxina  -hormonas femeninas, incluyendo el contraconceptivo o las píldoras anticonceptivas  -suplementos de aniyah  -ketamina  -productos líquidos de nutrición, lucinda Ensure  -medicamentos para resfríos y problemas respiratorios  -medicamentos para la diabetes  -medicamentos para la depresión mental  -medicamentos o productos a base de hierbas para bajar de peso o reducir el apetito  -fenobarbital u otros barbitúricos  -fenitoína  -prednisona u otros corticosteroides  -rifabutina  -rifampicina  -isoflavonas de soya  -sucralfato  -teofilina  -warfarina  Puede ser que esta lista no menciona todas las posibles interacciones. Informe a serrano profesional de la juan de todos los productos a base de hierbas, medicamentos de venta derick o suplementos nutritivos que esté tomando. Si usted fuma, consume bebidas alcohólicas o si utiliza drogas ilegales, indíqueselo también a serrano profesional de la juan. Algunas sustancias pueden interactuar con serrano medicamento.  ¿A qué aneta estar atento al usar rodrigue medicamento?  Asegúrese de hernandez rodrigue medicamento con líquidos en abundancia. Ciertas marcas de las tabletas pueden provocar asfixia, arcadas o dificultad al tragar a amber de que la tableta se le puede quedar atascada en la garganta. La mayoría de estos problemas se desaparecen cuando tome el medicamento con la cantidad adecuada de agua u otros líquidos.  No cambie de freedom de rodrigue medicamento a menos que serrano profesional de la juan esté de acuerdo. Consulte con serrano profesional de la juan si no está seguro.  Necesitará realizarse análisis regularmente y análisis de sandor de vez en cuando para evaluar serrano respuesta al tratamiento. Si está usando rodrigue medicamento para el hipotiroidismo, pueden transcurrir varias semanas antes de que observe  chasidy mejoría. Consulte con serrano médico o con serrano profesional de la juan si clover síntomas no mejoran.  Brady vez deba usar rodrigue medicamento de por get. No deje de usarlo a menos que serrano médico o serrano profesional de la juan así lo indique.  Rodrigue medicamento puede afectar serrano nivel de azúcar en la sandor. Si tiene diabetes, controle serrano nivel de azúcar lucinda se le haya indicado.  Es posible que se le caiga un poco el yazan al comenzar rodrigue tratamiento. Por lo general rodrigue problema se resuelve solo.  Si va a someterse a chasidy operación, informe a serrano médico o a serrano profesional de la juan que está tomando rodrigue medicamento.  ¿Qué efectos secundarios puedo tener al utilizar rodrigue medicamento?  Efectos secundarios que debe informar a serrano médico o a serrano profesional de la juan tan pronto lucinda sea posible:  -reacciones alérgicas lucinda erupción cutánea, picazón o urticarias, hinchazón de la reji, labios o lengua  -dolor en el pecho  -sudoración excesiva o intolerancia al calor  -pulso cardiaco rápido o irregular  -nerviosismo  -erupción cutánea o urticaria  -hinchazón de tobillos, pies o piernas  -temblores  Efectos secundarios que, por lo general, no requieren atención médica (debe informarlos a serrano médico o a serrano profesional de la juan si persisten o si son molestos):  -cambios en el apetito  -cambios en los períodos menstruales  -diarrea  -caída del yazan  -dolor de radha  -problemas para dormir  -pérdida de peso  Puede ser que esta lista no menciona todos los posibles efectos secundarios. Comuníquese a serrano médico por asesoramiento médico sobre los efectos secundarios. guzman puede informar los efectos secundarios a la FDA por teléfono al 1-866-FDA-9522.  ¿Dónde aneta guardar mi medicina?  [place]anténgala fuera del alcance de los niños.  Guárdela a temperatura ambiente, entre 15 y 30 grados C (59 y 86 grados F). Protéjala serafin y de la humedad. Mantenga el envase obdulia cerrado. Deseche todo el medicamento que no haya utilizado, después de la  fecha de vencimiento.  ATENCIÓN: [place] folleto es un resumen. Puede ser que no cubra toda la posible información. Si usted tiene preguntas acerca de esta medicina, consulte con serrano médico, serrano farmacéutico o serrano profesional de la juan.  © 2014, Elsevier/Gold Standard. (5/21/2010 5:07:22 PM)

## 2018-02-19 NOTE — THERAPY
"Occupational Therapy Evaluation completed.   Functional Status: Supv supine > EOB, SBA transfers with FWW, supv standing grooming, mod A LB dressing   Plan of Care: Will benefit from Occupational Therapy 2 times per week  Discharge Recommendations:  Equipment: Front-Wheel Walker. Post-acute therapy Discharge to home with home health for additional skilled therapy services.    See \"Rehab Therapy-Acute\" Patient Summary Report for complete documentation.    82 y.o. male with h/o HTN, CAD, stent x2, who presented with LLE pain, edema. Dx with cellulitis. Pt seen now for OT eval. Pt completed: bed mobility, amb to bathroom, attempted BM, standing grooming, LB dressing, up to chair at end of session. Pt not using FWW appropriately this session; tends to carry walker during gait and move it off to the side during standing activity. He required assist with LB dressing, in part due to bulkly L calf gauze dressing. Communication limited by language barrier and inability to effectively use Language Line (and no family present). Per PT note, pt lives alone and was independent PTA. Acute OT to follow to determine PLOF for ADL, home set-up for bathing, available assist, and make recommendations accordingly.    "

## 2018-02-19 NOTE — PROGRESS NOTES
Assumed care of patient, family at bedside, denies needs at this time, call light within reach, bed in lowest position.

## 2018-02-20 NOTE — DISCHARGE PLANNING
Received page from pts RN [phone] wondering about pts HHC.     Relayed unable to confirm HHC at this time. Tubed facesheet and choice sheet to pts RN for family to make contact following d/c. [facility] can check tomorrow to ensure HHC is set up, and send to another agency if needed. LM for [facility] [phone] regarding f/u.

## 2018-02-20 NOTE — PROGRESS NOTES
Paged Melanie x 2 to see if it is OK for patient to go home with out  approval. Referral has been sent in by ARABELLA.

## 2018-02-20 NOTE — THERAPY
Pt's family now present. Discussed OT findings from earlier eval and confirmed further details regarding PLOF and bathroom set-up. Pt's family report their biggest concern is that pt still drives and they do not feel he is safe. Educated that even if MD recommends revoking pt's licence, family still needs to remove vehicle or at least keys. Educated on transportation alternatives. Family states understanding. Recommended shower chair and rubber mat to increase bath safety. Family agreeable. They state tub/shower and toilet have grab bars. Recommend HH OT. Family agreeable. Pt would benefit from increased supv at home given frequency of falls, however family works and can only check on pt 3-4 times per week. Acute OT to follow while pt is in-house.

## 2018-02-20 NOTE — PROGRESS NOTES
Melanie paged back and it is OK for family to get patient tonight. Patient's family is able to get patient and help get him settled in at home. Nguyen (patient relative picking him up) would like for staff to wait to go through discharge paperwork until she arrives.

## 2018-02-20 NOTE — PROGRESS NOTES
Patient transported off floor via wheelchair with [facility] transport. Patient left with family, daughter Nguyen walking with transport. All patient belongings with patient. Monitor removed from patient and returned to front.  Monitor room notified.

## 2018-02-21 NOTE — DISCHARGE PLANNING
ATTN: Case Management  RE: Referral for Home Health    Reason for referral denial: We are not in network with this patients [facility] plan ([facility] [employer])                  We would like to take this opportunity to thank you for submitting a referral for your patient to continue the journey to recovery with [facility]. We hope to facilitate continued referrals from your facility as our goal is to provide quality, skilled care to as many patients as possible.            Unfortunately, we are not able to accept your patient into our service for the reason listed above. If further clarity is needed, our Intake Coordinators are available to discuss any barriers to service.            If you have any questions or concerns regarding this or future patients’ transition to Home Health, please do not hesitate to contact us. We are open for referrals 7 days a week from 8AM to 5PM at [phone].      We look forward to collaborating with you in the future,  [facility] Team

## 2018-02-21 NOTE — DISCHARGE PLANNING
Received notice from [facility], patient has been denied for reason of: not contacted with insurance.   Per direction from ARABELLA Mcnally, referral has been sent to [facility].

## 2018-02-27 ENCOUNTER — PATIENT OUTREACH (OUTPATIENT)
Dept: HEALTH INFORMATION MANAGEMENT | Facility: OTHER | Age: 83
End: 2018-02-27

## 2018-02-27 NOTE — PROGRESS NOTES
02/27/2018 1000 - Discharge Outreach - received call from daughter.  patient was discharged home with Home Health, but no one has called. [facility] [place] its been a week since his LE dressings were changed and they don't know what to do. Per [facility], Home Health agencies denied him d/t insurance. [facility] [place] he can't get into PCP for 6 weeks. [facility] [place] they took him to [facility], but they didn't change his dressings. Advised to call [facility] and ask to get in to changed dressings and ask for HH to be ordered thru [place](patient has [employer]). If unable to get dressings changed or HH set up, to return to ED.

## 2018-02-28 ENCOUNTER — HOSPITAL ENCOUNTER (EMERGENCY)
Facility: MEDICAL CENTER | Age: 83
End: 2018-02-28
Attending: EMERGENCY MEDICINE
Payer: COMMERCIAL

## 2018-02-28 VITALS
HEIGHT: 64 IN | SYSTOLIC BLOOD PRESSURE: 130 MMHG | HEART RATE: 57 BPM | RESPIRATION RATE: 19 BRPM | DIASTOLIC BLOOD PRESSURE: 55 MMHG | TEMPERATURE: 98.6 F | OXYGEN SATURATION: 96 %

## 2018-02-28 DIAGNOSIS — L03.119 CELLULITIS AND ABSCESS OF LEG: ICD-10-CM

## 2018-02-28 DIAGNOSIS — L02.419 CELLULITIS AND ABSCESS OF LEG: ICD-10-CM

## 2018-02-28 PROCEDURE — 303485 HCHG DRESSING MEDIUM

## 2018-02-28 PROCEDURE — 99284 EMERGENCY DEPT VISIT MOD MDM: CPT

## 2018-02-28 PROCEDURE — 304561 HCHG STAT O2

## 2018-02-28 PROCEDURE — 302874 HCHG BANDAGE ACE 2 OR 3""

## 2018-02-28 NOTE — ED TRIAGE NOTES
Pt bib family. Pt discharged from hospital on 2/18 for leg swelling. Pt with dressing to LLE. Pt was at his daughters house since discharge and was supposed to have home health nurse come to house, due to insurance reasons unable to get nurse to come to home. Daughter finally able to arrange home health nurse through a different company. During the middle of the night lastnight pt left [facility] and drove to his own apt. Pt missed home health nurse today at [facility] because he was at his own apt.,  to change dressings to LLE. Pt ambulatory. Pt on home 02, oxygen placed on pt. Pt with swelling to LLE.

## 2018-03-01 NOTE — ED NOTES
"ERP on phone with daughter,  Instructed to f/u with wound care.  She will not be able to pick pt up for \"maybe 2 hours\"  "

## 2018-03-01 NOTE — ED NOTES
used to communicate with patient. He is not able to answer orientation questions for RN. Does not know when dressing was changed last.

## 2018-03-01 NOTE — ED NOTES
VSS.  Discharge instructions given to pt and family- verbalizes understanding.   Ambulatory to lobby with steady gait.

## 2018-03-01 NOTE — DISCHARGE INSTRUCTIONS
Please contact his wound care team tomorrow morning to schedule a follow-up appointment for wound recheck and dressing change. Return to the emergency department if you develop fevers, worsening leg swelling, shortness of breath, or any further concerns. Please wear your home oxygen as directed. Please call your primary care physician tomorrow to set up a breathing recheck within the next 24 hours. Continue to take all of your medications as prescribed.        Cellulitis  Cellulitis is an infection of the skin and the tissue under the skin. The infected area is usually red and tender. This happens most often in the arms and lower legs.  HOME CARE   · Take your antibiotic medicine as told. Finish the medicine even if you start to feel better.  · Keep the infected arm or leg raised (elevated).  · Put a warm cloth on the area up to 4 times per day.  · Only take medicines as told by your doctor.  · Keep all doctor visits as told.  GET HELP IF:  · You see red streaks on the skin coming from the infected area.  · Your red area gets bigger or turns a dark color.  · Your bone or joint under the infected area is painful after the skin heals.  · Your infection comes back in the same area or different area.  · You have a puffy (swollen) bump in the infected area.  · You have new symptoms.  · You have a fever.  GET HELP RIGHT AWAY IF:   · You feel very sleepy.  · You throw up (vomit) or have watery poop (diarrhea).  · You feel sick and have muscle aches and pains.  MAKE SURE YOU:   · Understand these instructions.  · Will watch your condition.  · Will get help right away if you are not doing well or get worse.     This information is not intended to replace advice given to you by your health care provider. Make sure you discuss any questions you have with your health care provider.     Document Released: 06/05/2009 Document Revised: 01/08/2016 Document Reviewed: 03/04/2013  Elsevier Interactive Patient Education ©2016 Elsevier  Inc.

## 2018-03-01 NOTE — ED NOTES
Pt was recently admitted for CHF. He was having difficulty setting up wound care at home and there for has not had dressing changes since 2/19.   Daughter states he missed wound care today because he left her house in the middle of the night.   She was unable to stay but will pick pt up when he is ready

## 2018-03-01 NOTE — ED NOTES
Wound to (L) ankle redressed with foam dressing and ace wrap.  Tech to bedside to assist pt with dressing.  Daughter called to  pt.

## 2018-03-01 NOTE — ED PROVIDER NOTES
ED Provider Note    ED Provider Note    Scribed for Symone Smith M.D. by Symone Smith. 2/28/2018, 4:31 PM.    Primary care provider: Marta Villarreal M.D.  Means of arrival: Walk-in  History obtained from: Patient and daughter  History limited by: None    CHIEF COMPLAINT  Bilateral lower extremity pain.    BECKI Polanco is a 82 y.o. Male with who presents to the Emergency Department for evaluation of a wound on his left lower extremity. Since he was last discharged from the hospital on 2/18 he reports that the wound on his left lower extremity has decreased in size and has improved in appearance. He denies fever. He is not entirely certain as to why he was brought to the emergency department today.    I discussed the case with his daughter, who brought him in to the emergency department today. She is not present at bedside, I was able to reach her by phone. She states that he was discharged from the hospital 10 days ago, however has not had his dressing changed since that time. She was able to schedule a home health check for him today for dressing change, however the patient left her house before home health showed up and was not home for that visit. She also states that he was discharged on oxygen, however whenever he leaves the house he does not take his oxygen with him. His daughter has not noticed any worsening pain, no drainage, no swelling. States that he has not appeared to have any worsening symptoms since discharge. The patient states his wound and symptoms have been improving since time of discharge.    He was admitted 2/18 for a left lower extremity cellulitis as well as concern for heart failure. He did have some lower extremity edema and chest x-ray concerning for heart failure, however his cardiac echo was normal appearing, he did have a normal ejection fraction.    In room conversation was translated by the .    REVIEW OF SYSTEMS  Pertinent positives include bilateral  extremity pain, swelling, and blistering as well as evaluation of the wound on his left lower extremity. Pertinent negatives include no fever.  E    PAST MEDICAL HISTORY   has a past medical history of Acute myocardial infarction, subendocardial infarction, episode of care unspecified (6/18/2012); Anginal syndrome (CMS-HCC); Benign essential HTN; CAD in native artery; Carotid bruit; Cataract; Chest tightness, discomfort, or pressure; Chronic kidney disease, unspecified (6/18/2012); Chronic renal insufficiency; Congestive heart failure (CMS-HCC); COPD (chronic obstructive pulmonary disease) (CMS-HCC); Coronary atherosclerosis of native coronary artery (6/18/2012); Edema; Edema (6/18/2012); Essential hypertension, benign (6/18/2012); Fall; History of echocardiogram (2/16/2010); heart artery stent (2/15/10 by BRIANA); Hyperlipidemia; Hypothyroidism; MI, old; Non compliance with medical treatment; Non-Q wave myocardial infarction (CMS-HCC); Old myocardial infarction (6/18/2012); Other and unspecified hyperlipidemia (6/18/2012); Other chest pain (6/18/2012); Pneumonia; S/P coronary artery stent placement (6/18/2012); Stroke (CMS-HCC) (16 years ago); Unspecified hypothyroidism (6/18/2012); and Urinary incontinence.    SURGICAL HISTORY   has a past surgical history that includes cholecystectomy.    SOCIAL HISTORY  Social History   Substance Use Topics   • Smoking status: Former Smoker     Types: Cigarettes   • Smokeless tobacco: Former User     Quit date: 9/24/1974   • Alcohol use Not on file      History   Drug use: Unknown       FAMILY HISTORY  Family History   Problem Relation Age of Onset   • Heart Disease Mother    • Heart Disease Father        CURRENT MEDICATIONS  No current facility-administered medications on file prior to encounter.      Current Outpatient Prescriptions on File Prior to Encounter   Medication Sig Dispense Refill   • albuterol 108 (90 Base) MCG/ACT Aero Soln inhalation aerosol Inhale 2 Puffs by mouth  "every 6 hours as needed for Shortness of Breath. 8.5 g 2   • atorvastatin (LIPITOR) 40 MG Tab Take 1 Tab by mouth every bedtime. 30 Tab 2   • carvedilol (COREG) 3.125 MG Tab Take 1 Tab by mouth 2 times a day, with meals. 60 Tab 2   • ferrous sulfate 325 (65 Fe) MG tablet Take 1 Tab by mouth 2 times a day, with meals. 30 Tab 2   • potassium chloride SA (KDUR) 20 MEQ Tab CR Take 1 Tab by mouth every day. 60 Tab 11   • levothyroxine (SYNTHROID) 50 MCG Tab Take 1 Tab by mouth Every morning on an empty stomach. 30 Tab 2   • GARLIC PO Take 1 Tab by mouth every day.     • Omega-3 Fatty Acids (FISH OIL PO) Take 1 Cap by mouth every day.     • aspirin (ASA) 81 MG CHEW Take 81 mg by mouth every day.         ALLERGIES  No Known Allergies    PHYSICAL EXAM  Vital Signs: /55   Pulse 68   Temp 37 °C (98.6 °F)   Resp 19   Ht 1.626 m (5' 4\")   SpO2 92%   Constitutional: Alert, no acute distress  HENT: Normocephalic, atraumatic, moist mucus membranes  Cardiovascular: Regular rhythm, Normal peripheral perfusion, no cyanosis, Normal cardiac auscultation  Pulmonary: No respiratory distress, normal work of breathing with nasal cannulae in place, no accessory muscle usage, Very mild bibasilar crackles.  Abdomen: Nondistended  Skin: Warm, dry, Left leg with 6x4 cm well-healing area of cellulitis to the medial aspect of the lower leg just above the ankle with mild discomfort on palpation.  Musculoskeletal: Normal range of motion in all extremities, 1+ bilateral lower extremity edema, no deformity noted on the skin changes as noted above    COURSE & MEDICAL DECISION MAKING  Pertinent Labs & Imaging studies reviewed. (See chart for details)    Review of old medical records for continuity of care. Discharge summary reviewed. Patient admitted 2/14/18, discharge summary reviewed 2/19/18. Admitted for left leg cellulitis, found to have respiratory failure with hypoxia and pulmonary edema noted on chest x-ray. Started on IV diuretics " due to concern for congestive heart failure, echocardiogram was largely normal with normal ejection fraction of 60%. He did show improvement with IV diuretics. Started on antibiotics for lower extremity wound with cellulitis. Discharged home with plan to follow with wound care as an outpatient. Unable to wean from supplemental oxygen, discharged home on 1 L nasal cannula for use with ambulation. Outpatient sleep study ordered. Discharged on Keflex.    4:31 PM - Patient seen and examined at bedside. Ordered a dressing change.    4:49 PM I discussed the patient's case and the above findings with the patient's daughter over the phone. She states the that home health attempted to evaluate the patient today, but he was not present in his home upon their arrival. She has not noticed any fever or increased drainage from the wound since discharge. I informed his daughter that she will need to discuss the frequency of his bandage changes with Wound Care. His daughter agrees to have the patient discharged with a dressing change and a referral to his primary care.    Decision Making:  This is a 82 y.o. year old male who presents for left lower extremity cellulitis recheck and dressing recheck. The wound is well appearing on my physical exam, no surrounding erythema, no fluctuance. Very minor area of skin breakdown that appears to be healing. Patient also has no new complaints, states his wound is healing as well. He was hypoxic on arrival, however he is not using his home oxygen. Hypoxia corrected immediately with 1 L nasal cannula which is his home requirement. He does have some mild lower extremity edema which is consistent with his presentation at time of discharge. At this time I do not believe he requires further evaluation. I spoke with his daughter who agrees to call [facility] tomorrow to set up a follow-up appointment for home health recheck. Additionally she will set up a follow-up appointment with primary care for  continued monitoring of his oxygen requirement and breathing. Additionally she was instructed at time of discharge to schedule a follow-up for sleep apnea testing.    Return precautions given including shortness of breath, worsening pain, redness, fevers or any further concerns.    The patient will return for new or worsening symptoms and is stable at the time of discharge.    DISPOSITION:  Patient will be discharged home in stable condition.    FOLLOW UP:  Marta Villarreal M.D.  [address]  [address]  [facility] [address]  [phone]    Go in 1 day  For complete recheck    [facility], Emergency Dept  [address] [facility] [address] [phone]  Go to  If symptoms worsen    FINAL IMPRESSION  1. Cellulitis and abscess of leg        Deandre BEAN (Scribe), am scribing for, and in the presence of, Symone Smith M.D..    Electronically signed by: Deandre Vazquez (Scribe), 2/28/2018    ISymone M.D. personally performed the services described in this documentation, as scribed by Deandre Vazquez in my presence, and it is both accurate and complete.    The note accurately reflects work and decisions made by me.  Symone Smith  2/28/2018  6:01 PM

## 2018-10-16 ENCOUNTER — APPOINTMENT (OUTPATIENT)
Dept: INTERNAL MEDICINE | Facility: MEDICAL CENTER | Age: 83
End: 2018-10-16
Payer: MEDICAID

## 2021-01-14 DIAGNOSIS — Z23 NEED FOR VACCINATION: ICD-10-CM

## 2022-03-01 NOTE — PROGRESS NOTES
Monitor Summary:  SB/SR  59-70  0.18/0.08/0.40   Patient called needing refills. Tried returning call to patient with no answer left voicemail. Patient will need an apointment with another provider in office for refills.

## 2025-06-02 NOTE — CARE PLAN
Problem: Infection  Goal: Will remain free from infection    Intervention: Assess signs and symptoms of infection  Patient will verbalize signs and symptoms of infection, interventions that can prevent infection, and when to report signs and symptoms to a healthcare provider.       Problem: Knowledge Deficit  Goal: Knowledge of disease process/condition, treatment plan, diagnostic tests, and medications will improve    Intervention: Assess knowledge level of disease process/condition, treatment plan, diagnostic tests, and medications  Knowledge of disease process, current condition, plan of care, medications, and diagnostics will improve.          You can access the FollowMyHealth Patient Portal offered by [facility] by registering at the following website: [email or web address]. By joining [employer]’s FollowMyHealth portal, you will also be able to view your health information using other applications (apps) compatible with our system.